# Patient Record
Sex: FEMALE | Race: WHITE | NOT HISPANIC OR LATINO | Employment: OTHER | URBAN - METROPOLITAN AREA
[De-identification: names, ages, dates, MRNs, and addresses within clinical notes are randomized per-mention and may not be internally consistent; named-entity substitution may affect disease eponyms.]

---

## 2019-01-01 ENCOUNTER — APPOINTMENT (OUTPATIENT)
Dept: RADIOLOGY | Facility: MEDICAL CENTER | Age: 84
DRG: 871 | End: 2019-01-01
Attending: HOSPITALIST
Payer: MEDICARE

## 2019-01-01 ENCOUNTER — HOSPITAL ENCOUNTER (OUTPATIENT)
Dept: RADIOLOGY | Facility: MEDICAL CENTER | Age: 84
End: 2019-10-10

## 2019-01-01 ENCOUNTER — TELEPHONE (OUTPATIENT)
Dept: VASCULAR LAB | Facility: MEDICAL CENTER | Age: 84
End: 2019-01-01

## 2019-01-01 ENCOUNTER — HOSPITAL ENCOUNTER (OUTPATIENT)
Facility: MEDICAL CENTER | Age: 84
End: 2019-01-01
Payer: MEDICARE

## 2019-01-01 ENCOUNTER — APPOINTMENT (OUTPATIENT)
Dept: RADIOLOGY | Facility: MEDICAL CENTER | Age: 84
DRG: 871 | End: 2019-01-01
Attending: INTERNAL MEDICINE
Payer: MEDICARE

## 2019-01-01 ENCOUNTER — HOSPITAL ENCOUNTER (INPATIENT)
Facility: MEDICAL CENTER | Age: 84
LOS: 5 days | DRG: 871 | End: 2019-10-13
Attending: EMERGENCY MEDICINE | Admitting: HOSPITALIST
Payer: MEDICARE

## 2019-01-01 ENCOUNTER — APPOINTMENT (OUTPATIENT)
Dept: CARDIOLOGY | Facility: MEDICAL CENTER | Age: 84
DRG: 871 | End: 2019-01-01
Attending: INTERNAL MEDICINE
Payer: MEDICARE

## 2019-01-01 VITALS
SYSTOLIC BLOOD PRESSURE: 91 MMHG | HEART RATE: 91 BPM | WEIGHT: 139.33 LBS | RESPIRATION RATE: 20 BRPM | DIASTOLIC BLOOD PRESSURE: 59 MMHG | OXYGEN SATURATION: 100 % | TEMPERATURE: 97.9 F | HEIGHT: 66 IN | BODY MASS INDEX: 22.39 KG/M2

## 2019-01-01 DIAGNOSIS — I95.89 HYPOTENSION DUE TO HYPOVOLEMIA: Primary | ICD-10-CM

## 2019-01-01 DIAGNOSIS — E86.0 DEHYDRATION: ICD-10-CM

## 2019-01-01 DIAGNOSIS — E86.1 HYPOTENSION DUE TO HYPOVOLEMIA: Primary | ICD-10-CM

## 2019-01-01 DIAGNOSIS — I48.91 ATRIAL FIBRILLATION WITH RAPID VENTRICULAR RESPONSE (HCC): ICD-10-CM

## 2019-01-01 DIAGNOSIS — N17.9 AKI (ACUTE KIDNEY INJURY) (HCC): ICD-10-CM

## 2019-01-01 DIAGNOSIS — R11.2 NAUSEA AND VOMITING, INTRACTABILITY OF VOMITING NOT SPECIFIED, UNSPECIFIED VOMITING TYPE: ICD-10-CM

## 2019-01-01 LAB
ALBUMIN SERPL BCP-MCNC: 2.9 G/DL (ref 3.2–4.9)
ALBUMIN SERPL BCP-MCNC: 3.3 G/DL (ref 3.2–4.9)
ALBUMIN SERPL BCP-MCNC: 3.4 G/DL (ref 3.2–4.9)
ALBUMIN/GLOB SERPL: 1.2 G/DL
ALBUMIN/GLOB SERPL: 1.5 G/DL
ALBUMIN/GLOB SERPL: 1.6 G/DL
ALP SERPL-CCNC: 88 U/L (ref 30–99)
ALP SERPL-CCNC: 95 U/L (ref 30–99)
ALP SERPL-CCNC: 98 U/L (ref 30–99)
ALT SERPL-CCNC: 12 U/L (ref 2–50)
ALT SERPL-CCNC: 15 U/L (ref 2–50)
ALT SERPL-CCNC: 15 U/L (ref 2–50)
ANION GAP SERPL CALC-SCNC: 12 MMOL/L (ref 0–11.9)
ANION GAP SERPL CALC-SCNC: 13 MMOL/L (ref 0–11.9)
ANION GAP SERPL CALC-SCNC: 13 MMOL/L (ref 0–11.9)
ANION GAP SERPL CALC-SCNC: 16 MMOL/L (ref 0–11.9)
ANION GAP SERPL CALC-SCNC: 16 MMOL/L (ref 0–11.9)
ANION GAP SERPL CALC-SCNC: 9 MMOL/L (ref 0–11.9)
ANISOCYTOSIS BLD QL SMEAR: ABNORMAL
APTT PPP: 31.5 SEC (ref 24.7–36)
AST SERPL-CCNC: 15 U/L (ref 12–45)
AST SERPL-CCNC: 21 U/L (ref 12–45)
AST SERPL-CCNC: 25 U/L (ref 12–45)
BACTERIA BLD CULT: NORMAL
BACTERIA BLD CULT: NORMAL
BASOPHILS # BLD AUTO: 0 % (ref 0–1.8)
BASOPHILS # BLD AUTO: 0 % (ref 0–1.8)
BASOPHILS # BLD: 0 K/UL (ref 0–0.12)
BASOPHILS # BLD: 0 K/UL (ref 0–0.12)
BILIRUB SERPL-MCNC: 1.1 MG/DL (ref 0.1–1.5)
BILIRUB SERPL-MCNC: 1.5 MG/DL (ref 0.1–1.5)
BILIRUB SERPL-MCNC: 1.6 MG/DL (ref 0.1–1.5)
BUN SERPL-MCNC: 114 MG/DL (ref 8–22)
BUN SERPL-MCNC: 121 MG/DL (ref 8–22)
BUN SERPL-MCNC: 28 MG/DL (ref 8–22)
BUN SERPL-MCNC: 47 MG/DL (ref 8–22)
BUN SERPL-MCNC: 70 MG/DL (ref 8–22)
BUN SERPL-MCNC: 8 MG/DL (ref 8–22)
BURR CELLS BLD QL SMEAR: NORMAL
BURR CELLS BLD QL SMEAR: NORMAL
CALCIUM SERPL-MCNC: 6.7 MG/DL (ref 8.5–10.5)
CALCIUM SERPL-MCNC: 7.9 MG/DL (ref 8.5–10.5)
CALCIUM SERPL-MCNC: 8.2 MG/DL (ref 8.5–10.5)
CALCIUM SERPL-MCNC: 8.4 MG/DL (ref 8.5–10.5)
CALCIUM SERPL-MCNC: 8.6 MG/DL (ref 8.5–10.5)
CALCIUM SERPL-MCNC: 8.8 MG/DL (ref 8.5–10.5)
CHLORIDE SERPL-SCNC: 104 MMOL/L (ref 96–112)
CHLORIDE SERPL-SCNC: 109 MMOL/L (ref 96–112)
CHLORIDE SERPL-SCNC: 110 MMOL/L (ref 96–112)
CHLORIDE SERPL-SCNC: 112 MMOL/L (ref 96–112)
CHLORIDE SERPL-SCNC: 96 MMOL/L (ref 96–112)
CHLORIDE SERPL-SCNC: 96 MMOL/L (ref 96–112)
CHLORIDE UR-SCNC: <15 MMOL/L
CO2 SERPL-SCNC: 19 MMOL/L (ref 20–33)
CO2 SERPL-SCNC: 21 MMOL/L (ref 20–33)
CO2 SERPL-SCNC: 22 MMOL/L (ref 20–33)
CO2 SERPL-SCNC: 23 MMOL/L (ref 20–33)
CORTIS SERPL-MCNC: 34.7 UG/DL (ref 0–23)
CREAT SERPL-MCNC: 0.62 MG/DL (ref 0.5–1.4)
CREAT SERPL-MCNC: 0.71 MG/DL (ref 0.5–1.4)
CREAT SERPL-MCNC: 0.83 MG/DL (ref 0.5–1.4)
CREAT SERPL-MCNC: 1.08 MG/DL (ref 0.5–1.4)
CREAT SERPL-MCNC: 2.44 MG/DL (ref 0.5–1.4)
CREAT SERPL-MCNC: 3.01 MG/DL (ref 0.5–1.4)
CREAT UR-MCNC: 103 MG/DL
D DIMER PPP IA.FEU-MCNC: 4.47 UG/ML (FEU) (ref 0–0.5)
EKG IMPRESSION: NORMAL
EOSINOPHIL # BLD AUTO: 0 K/UL (ref 0–0.51)
EOSINOPHIL # BLD AUTO: 0.18 K/UL (ref 0–0.51)
EOSINOPHIL NFR BLD: 0 % (ref 0–6.9)
EOSINOPHIL NFR BLD: 0.9 % (ref 0–6.9)
ERYTHROCYTE [DISTWIDTH] IN BLOOD BY AUTOMATED COUNT: 45.4 FL (ref 35.9–50)
ERYTHROCYTE [DISTWIDTH] IN BLOOD BY AUTOMATED COUNT: 46.3 FL (ref 35.9–50)
ERYTHROCYTE [DISTWIDTH] IN BLOOD BY AUTOMATED COUNT: 47.3 FL (ref 35.9–50)
ERYTHROCYTE [DISTWIDTH] IN BLOOD BY AUTOMATED COUNT: 49.9 FL (ref 35.9–50)
ERYTHROCYTE [DISTWIDTH] IN BLOOD BY AUTOMATED COUNT: 53.7 FL (ref 35.9–50)
ERYTHROCYTE [DISTWIDTH] IN BLOOD BY AUTOMATED COUNT: 56.1 FL (ref 35.9–50)
GLOBULIN SER CALC-MCNC: 2.1 G/DL (ref 1.9–3.5)
GLOBULIN SER CALC-MCNC: 2.2 G/DL (ref 1.9–3.5)
GLOBULIN SER CALC-MCNC: 2.4 G/DL (ref 1.9–3.5)
GLUCOSE SERPL-MCNC: 101 MG/DL (ref 65–99)
GLUCOSE SERPL-MCNC: 116 MG/DL (ref 65–99)
GLUCOSE SERPL-MCNC: 123 MG/DL (ref 65–99)
GLUCOSE SERPL-MCNC: 85 MG/DL (ref 65–99)
GLUCOSE SERPL-MCNC: 98 MG/DL (ref 65–99)
GLUCOSE SERPL-MCNC: 98 MG/DL (ref 65–99)
HCT VFR BLD AUTO: 36.5 % (ref 37–47)
HCT VFR BLD AUTO: 37.5 % (ref 37–47)
HCT VFR BLD AUTO: 38.2 % (ref 37–47)
HCT VFR BLD AUTO: 38.9 % (ref 37–47)
HCT VFR BLD AUTO: 39.6 % (ref 37–47)
HCT VFR BLD AUTO: 40.4 % (ref 37–47)
HGB BLD-MCNC: 11.8 G/DL (ref 12–16)
HGB BLD-MCNC: 11.9 G/DL (ref 12–16)
HGB BLD-MCNC: 12 G/DL (ref 12–16)
HGB BLD-MCNC: 12.5 G/DL (ref 12–16)
HGB BLD-MCNC: 12.6 G/DL (ref 12–16)
HGB BLD-MCNC: 13.2 G/DL (ref 12–16)
INR PPP: 1.18 (ref 0.87–1.13)
INR PPP: 1.33 (ref 0.87–1.13)
INR PPP: 2.61 (ref 0.87–1.13)
LACTATE BLD-SCNC: 1.4 MMOL/L (ref 0.5–2)
LACTATE BLD-SCNC: 1.6 MMOL/L (ref 0.5–2)
LACTATE BLD-SCNC: 1.7 MMOL/L (ref 0.5–2)
LACTATE BLD-SCNC: 1.7 MMOL/L (ref 0.5–2)
LV EJECT FRACT  99904: 65
LV EJECT FRACT MOD 2C 99903: 47.58
LV EJECT FRACT MOD 4C 99902: 52.47
LV EJECT FRACT MOD BP 99901: 50.1
LYMPHOCYTES # BLD AUTO: 1.17 K/UL (ref 1–4.8)
LYMPHOCYTES # BLD AUTO: 2.54 K/UL (ref 1–4.8)
LYMPHOCYTES NFR BLD: 12.4 % (ref 22–41)
LYMPHOCYTES NFR BLD: 6.1 % (ref 22–41)
MACROCYTES BLD QL SMEAR: ABNORMAL
MAGNESIUM SERPL-MCNC: 2 MG/DL (ref 1.5–2.5)
MAGNESIUM SERPL-MCNC: 2.4 MG/DL (ref 1.5–2.5)
MANUAL DIFF BLD: NORMAL
MANUAL DIFF BLD: NORMAL
MCH RBC QN AUTO: 25.3 PG (ref 27–33)
MCH RBC QN AUTO: 25.9 PG (ref 27–33)
MCH RBC QN AUTO: 26.1 PG (ref 27–33)
MCH RBC QN AUTO: 26.1 PG (ref 27–33)
MCH RBC QN AUTO: 26.2 PG (ref 27–33)
MCH RBC QN AUTO: 26.5 PG (ref 27–33)
MCHC RBC AUTO-ENTMCNC: 29.5 G/DL (ref 33.6–35)
MCHC RBC AUTO-ENTMCNC: 30.8 G/DL (ref 33.6–35)
MCHC RBC AUTO-ENTMCNC: 32.3 G/DL (ref 33.6–35)
MCHC RBC AUTO-ENTMCNC: 32.7 G/DL (ref 33.6–35)
MCHC RBC AUTO-ENTMCNC: 33.3 G/DL (ref 33.6–35)
MCHC RBC AUTO-ENTMCNC: 33.6 G/DL (ref 33.6–35)
MCV RBC AUTO: 77.8 FL (ref 81.4–97.8)
MCV RBC AUTO: 78.3 FL (ref 81.4–97.8)
MCV RBC AUTO: 79.1 FL (ref 81.4–97.8)
MCV RBC AUTO: 81.8 FL (ref 81.4–97.8)
MCV RBC AUTO: 84.9 FL (ref 81.4–97.8)
MCV RBC AUTO: 86 FL (ref 81.4–97.8)
MONOCYTES # BLD AUTO: 0.18 K/UL (ref 0–0.85)
MONOCYTES # BLD AUTO: 0.82 K/UL (ref 0–0.85)
MONOCYTES NFR BLD AUTO: 0.9 % (ref 0–13.4)
MONOCYTES NFR BLD AUTO: 4.3 % (ref 0–13.4)
MORPHOLOGY BLD-IMP: NORMAL
MORPHOLOGY BLD-IMP: NORMAL
NEUTROPHILS # BLD AUTO: 17.11 K/UL (ref 2–7.15)
NEUTROPHILS # BLD AUTO: 17.59 K/UL (ref 2–7.15)
NEUTROPHILS NFR BLD: 78.7 % (ref 44–72)
NEUTROPHILS NFR BLD: 80.9 % (ref 44–72)
NEUTS BAND NFR BLD MANUAL: 7.1 % (ref 0–10)
NEUTS BAND NFR BLD MANUAL: 8.7 % (ref 0–10)
NRBC # BLD AUTO: 0 K/UL
NRBC # BLD AUTO: 0 K/UL
NRBC BLD-RTO: 0 /100 WBC
NRBC BLD-RTO: 0 /100 WBC
PHOSPHATE SERPL-MCNC: 3.8 MG/DL (ref 2.5–4.5)
PLATELET # BLD AUTO: 160 K/UL (ref 164–446)
PLATELET # BLD AUTO: 164 K/UL (ref 164–446)
PLATELET # BLD AUTO: 186 K/UL (ref 164–446)
PLATELET # BLD AUTO: 211 K/UL (ref 164–446)
PLATELET # BLD AUTO: 227 K/UL (ref 164–446)
PLATELET # BLD AUTO: 234 K/UL (ref 164–446)
PLATELET BLD QL SMEAR: NORMAL
PLATELET BLD QL SMEAR: NORMAL
PMV BLD AUTO: 10.1 FL (ref 9–12.9)
PMV BLD AUTO: 10.4 FL (ref 9–12.9)
PMV BLD AUTO: 10.6 FL (ref 9–12.9)
PMV BLD AUTO: 10.7 FL (ref 9–12.9)
PMV BLD AUTO: 10.7 FL (ref 9–12.9)
PMV BLD AUTO: 10.8 FL (ref 9–12.9)
POIKILOCYTOSIS BLD QL SMEAR: NORMAL
POIKILOCYTOSIS BLD QL SMEAR: NORMAL
POTASSIUM SERPL-SCNC: 3.2 MMOL/L (ref 3.6–5.5)
POTASSIUM SERPL-SCNC: 3.3 MMOL/L (ref 3.6–5.5)
POTASSIUM SERPL-SCNC: 3.3 MMOL/L (ref 3.6–5.5)
POTASSIUM SERPL-SCNC: 3.4 MMOL/L (ref 3.6–5.5)
POTASSIUM SERPL-SCNC: 3.5 MMOL/L (ref 3.6–5.5)
POTASSIUM SERPL-SCNC: 3.7 MMOL/L (ref 3.6–5.5)
POTASSIUM UR-SCNC: 54.9 MMOL/L
PROCALCITONIN SERPL-MCNC: 3.77 NG/ML
PROT SERPL-MCNC: 5.3 G/DL (ref 6–8.2)
PROT SERPL-MCNC: 5.4 G/DL (ref 6–8.2)
PROT SERPL-MCNC: 5.6 G/DL (ref 6–8.2)
PROT UR-MCNC: 61.4 MG/DL (ref 0–15)
PROTHROMBIN TIME: 15.3 SEC (ref 12–14.6)
PROTHROMBIN TIME: 16.9 SEC (ref 12–14.6)
PROTHROMBIN TIME: 28.9 SEC (ref 12–14.6)
RBC # BLD AUTO: 4.46 M/UL (ref 4.2–5.4)
RBC # BLD AUTO: 4.58 M/UL (ref 4.2–5.4)
RBC # BLD AUTO: 4.7 M/UL (ref 4.2–5.4)
RBC # BLD AUTO: 4.82 M/UL (ref 4.2–5.4)
RBC # BLD AUTO: 4.83 M/UL (ref 4.2–5.4)
RBC # BLD AUTO: 5.06 M/UL (ref 4.2–5.4)
RBC BLD AUTO: PRESENT
RBC BLD AUTO: PRESENT
SIGNIFICANT IND 70042: NORMAL
SIGNIFICANT IND 70042: NORMAL
SITE SITE: NORMAL
SITE SITE: NORMAL
SODIUM SERPL-SCNC: 133 MMOL/L (ref 135–145)
SODIUM SERPL-SCNC: 133 MMOL/L (ref 135–145)
SODIUM SERPL-SCNC: 140 MMOL/L (ref 135–145)
SODIUM SERPL-SCNC: 140 MMOL/L (ref 135–145)
SODIUM SERPL-SCNC: 143 MMOL/L (ref 135–145)
SODIUM SERPL-SCNC: 144 MMOL/L (ref 135–145)
SODIUM UR-SCNC: 19 MMOL/L
SOURCE SOURCE: NORMAL
SOURCE SOURCE: NORMAL
TOXIC GRANULES BLD QL SMEAR: SLIGHT
TROPONIN T SERPL-MCNC: 17 NG/L (ref 6–19)
TROPONIN T SERPL-MCNC: 21 NG/L (ref 6–19)
TROPONIN T SERPL-MCNC: 23 NG/L (ref 6–19)
TROPONIN T SERPL-MCNC: 24 NG/L (ref 6–19)
TSH SERPL DL<=0.005 MIU/L-ACNC: 10.92 UIU/ML (ref 0.38–5.33)
WBC # BLD AUTO: 10.9 K/UL (ref 4.8–10.8)
WBC # BLD AUTO: 15.3 K/UL (ref 4.8–10.8)
WBC # BLD AUTO: 17.8 K/UL (ref 4.8–10.8)
WBC # BLD AUTO: 18 K/UL (ref 4.8–10.8)
WBC # BLD AUTO: 19.1 K/UL (ref 4.8–10.8)
WBC # BLD AUTO: 20.5 K/UL (ref 4.8–10.8)

## 2019-01-01 PROCEDURE — 99223 1ST HOSP IP/OBS HIGH 75: CPT | Performed by: INTERNAL MEDICINE

## 2019-01-01 PROCEDURE — 700102 HCHG RX REV CODE 250 W/ 637 OVERRIDE(OP): Performed by: HOSPITALIST

## 2019-01-01 PROCEDURE — 83605 ASSAY OF LACTIC ACID: CPT | Mod: 91

## 2019-01-01 PROCEDURE — 700105 HCHG RX REV CODE 258: Performed by: HOSPITALIST

## 2019-01-01 PROCEDURE — 700105 HCHG RX REV CODE 258: Performed by: EMERGENCY MEDICINE

## 2019-01-01 PROCEDURE — 87040 BLOOD CULTURE FOR BACTERIA: CPT

## 2019-01-01 PROCEDURE — 85027 COMPLETE CBC AUTOMATED: CPT | Mod: 91

## 2019-01-01 PROCEDURE — 99233 SBSQ HOSP IP/OBS HIGH 50: CPT | Performed by: HOSPITALIST

## 2019-01-01 PROCEDURE — 36415 COLL VENOUS BLD VENIPUNCTURE: CPT

## 2019-01-01 PROCEDURE — 84156 ASSAY OF PROTEIN URINE: CPT

## 2019-01-01 PROCEDURE — 99232 SBSQ HOSP IP/OBS MODERATE 35: CPT | Performed by: HOSPITALIST

## 2019-01-01 PROCEDURE — 84145 PROCALCITONIN (PCT): CPT

## 2019-01-01 PROCEDURE — 84133 ASSAY OF URINE POTASSIUM: CPT

## 2019-01-01 PROCEDURE — A9270 NON-COVERED ITEM OR SERVICE: HCPCS | Performed by: HOSPITALIST

## 2019-01-01 PROCEDURE — 97162 PT EVAL MOD COMPLEX 30 MIN: CPT

## 2019-01-01 PROCEDURE — 700105 HCHG RX REV CODE 258: Performed by: INTERNAL MEDICINE

## 2019-01-01 PROCEDURE — 770020 HCHG ROOM/CARE - TELE (206)

## 2019-01-01 PROCEDURE — 700101 HCHG RX REV CODE 250: Performed by: HOSPITALIST

## 2019-01-01 PROCEDURE — 80048 BASIC METABOLIC PNL TOTAL CA: CPT

## 2019-01-01 PROCEDURE — 700102 HCHG RX REV CODE 250 W/ 637 OVERRIDE(OP): Performed by: INTERNAL MEDICINE

## 2019-01-01 PROCEDURE — 74220 X-RAY XM ESOPHAGUS 1CNTRST: CPT

## 2019-01-01 PROCEDURE — 71046 X-RAY EXAM CHEST 2 VIEWS: CPT

## 2019-01-01 PROCEDURE — 93306 TTE W/DOPPLER COMPLETE: CPT

## 2019-01-01 PROCEDURE — 93005 ELECTROCARDIOGRAM TRACING: CPT | Performed by: HOSPITALIST

## 2019-01-01 PROCEDURE — 85379 FIBRIN DEGRADATION QUANT: CPT

## 2019-01-01 PROCEDURE — 85027 COMPLETE CBC AUTOMATED: CPT

## 2019-01-01 PROCEDURE — 93005 ELECTROCARDIOGRAM TRACING: CPT | Performed by: EMERGENCY MEDICINE

## 2019-01-01 PROCEDURE — 93005 ELECTROCARDIOGRAM TRACING: CPT

## 2019-01-01 PROCEDURE — 99223 1ST HOSP IP/OBS HIGH 75: CPT | Mod: AI | Performed by: HOSPITALIST

## 2019-01-01 PROCEDURE — 84100 ASSAY OF PHOSPHORUS: CPT

## 2019-01-01 PROCEDURE — 99285 EMERGENCY DEPT VISIT HI MDM: CPT

## 2019-01-01 PROCEDURE — 83605 ASSAY OF LACTIC ACID: CPT

## 2019-01-01 PROCEDURE — 700111 HCHG RX REV CODE 636 W/ 250 OVERRIDE (IP): Performed by: EMERGENCY MEDICINE

## 2019-01-01 PROCEDURE — 93010 ELECTROCARDIOGRAM REPORT: CPT | Mod: 76 | Performed by: INTERNAL MEDICINE

## 2019-01-01 PROCEDURE — 99358 PROLONG SERVICE W/O CONTACT: CPT | Performed by: HOSPITALIST

## 2019-01-01 PROCEDURE — 85610 PROTHROMBIN TIME: CPT

## 2019-01-01 PROCEDURE — 84443 ASSAY THYROID STIM HORMONE: CPT

## 2019-01-01 PROCEDURE — 700111 HCHG RX REV CODE 636 W/ 250 OVERRIDE (IP): Performed by: INTERNAL MEDICINE

## 2019-01-01 PROCEDURE — 85007 BL SMEAR W/DIFF WBC COUNT: CPT

## 2019-01-01 PROCEDURE — 700111 HCHG RX REV CODE 636 W/ 250 OVERRIDE (IP): Performed by: HOSPITALIST

## 2019-01-01 PROCEDURE — 97165 OT EVAL LOW COMPLEX 30 MIN: CPT

## 2019-01-01 PROCEDURE — 51798 US URINE CAPACITY MEASURE: CPT

## 2019-01-01 PROCEDURE — 82533 TOTAL CORTISOL: CPT

## 2019-01-01 PROCEDURE — 85730 THROMBOPLASTIN TIME PARTIAL: CPT

## 2019-01-01 PROCEDURE — 700117 HCHG RX CONTRAST REV CODE 255: Performed by: INTERNAL MEDICINE

## 2019-01-01 PROCEDURE — 83735 ASSAY OF MAGNESIUM: CPT

## 2019-01-01 PROCEDURE — 93010 ELECTROCARDIOGRAM REPORT: CPT | Performed by: INTERNAL MEDICINE

## 2019-01-01 PROCEDURE — 82436 ASSAY OF URINE CHLORIDE: CPT

## 2019-01-01 PROCEDURE — A9270 NON-COVERED ITEM OR SERVICE: HCPCS | Performed by: INTERNAL MEDICINE

## 2019-01-01 PROCEDURE — 93306 TTE W/DOPPLER COMPLETE: CPT | Mod: 26 | Performed by: INTERNAL MEDICINE

## 2019-01-01 PROCEDURE — 97535 SELF CARE MNGMENT TRAINING: CPT

## 2019-01-01 PROCEDURE — 99239 HOSP IP/OBS DSCHRG MGMT >30: CPT | Performed by: HOSPITALIST

## 2019-01-01 PROCEDURE — 80053 COMPREHEN METABOLIC PANEL: CPT

## 2019-01-01 PROCEDURE — 82570 ASSAY OF URINE CREATININE: CPT

## 2019-01-01 PROCEDURE — 84484 ASSAY OF TROPONIN QUANT: CPT

## 2019-01-01 PROCEDURE — 71045 X-RAY EXAM CHEST 1 VIEW: CPT

## 2019-01-01 PROCEDURE — 71275 CT ANGIOGRAPHY CHEST: CPT

## 2019-01-01 PROCEDURE — 96365 THER/PROPH/DIAG IV INF INIT: CPT

## 2019-01-01 PROCEDURE — 76775 US EXAM ABDO BACK WALL LIM: CPT

## 2019-01-01 PROCEDURE — 92610 EVALUATE SWALLOWING FUNCTION: CPT

## 2019-01-01 PROCEDURE — 84484 ASSAY OF TROPONIN QUANT: CPT | Mod: 91

## 2019-01-01 PROCEDURE — 84300 ASSAY OF URINE SODIUM: CPT

## 2019-01-01 RX ORDER — LOVASTATIN 20 MG/1
20 TABLET ORAL NIGHTLY
Status: DISCONTINUED | OUTPATIENT
Start: 2019-01-01 | End: 2019-01-01 | Stop reason: HOSPADM

## 2019-01-01 RX ORDER — METRONIDAZOLE 500 MG/1
500 TABLET ORAL EVERY 8 HOURS
Status: DISCONTINUED | OUTPATIENT
Start: 2019-01-01 | End: 2019-01-01 | Stop reason: HOSPADM

## 2019-01-01 RX ORDER — METRONIDAZOLE 500 MG/1
500 TABLET ORAL EVERY 8 HOURS
Status: DISCONTINUED | OUTPATIENT
Start: 2019-01-01 | End: 2019-01-01

## 2019-01-01 RX ORDER — HEPARIN SODIUM 5000 [USP'U]/100ML
INJECTION, SOLUTION INTRAVENOUS CONTINUOUS
Status: DISCONTINUED | OUTPATIENT
Start: 2019-01-01 | End: 2019-01-01

## 2019-01-01 RX ORDER — POTASSIUM CHLORIDE 20 MEQ/1
40 TABLET, EXTENDED RELEASE ORAL ONCE
Status: DISPENSED | OUTPATIENT
Start: 2019-01-01 | End: 2019-01-01

## 2019-01-01 RX ORDER — HEPARIN SODIUM 1000 [USP'U]/ML
4000 INJECTION, SOLUTION INTRAVENOUS; SUBCUTANEOUS ONCE
Status: COMPLETED | OUTPATIENT
Start: 2019-01-01 | End: 2019-01-01

## 2019-01-01 RX ORDER — CLINDAMYCIN PHOSPHATE 600 MG/50ML
600 INJECTION, SOLUTION INTRAVENOUS EVERY 8 HOURS
Status: DISCONTINUED | OUTPATIENT
Start: 2019-01-01 | End: 2019-01-01

## 2019-01-01 RX ORDER — WARFARIN SODIUM 2.5 MG/1
2.5 TABLET ORAL DAILY
Qty: 30 TAB | Refills: 3 | Status: SHIPPED | OUTPATIENT
Start: 2019-01-01

## 2019-01-01 RX ORDER — SODIUM CHLORIDE 9 MG/ML
250 INJECTION, SOLUTION INTRAVENOUS ONCE
Status: COMPLETED | OUTPATIENT
Start: 2019-01-01 | End: 2019-01-01

## 2019-01-01 RX ORDER — CEFDINIR 250 MG/5ML
300 POWDER, FOR SUSPENSION ORAL EVERY 12 HOURS
Status: DISCONTINUED | OUTPATIENT
Start: 2019-01-01 | End: 2019-01-01 | Stop reason: HOSPADM

## 2019-01-01 RX ORDER — POLYETHYLENE GLYCOL 3350 17 G/17G
1 POWDER, FOR SOLUTION ORAL
Status: DISCONTINUED | OUTPATIENT
Start: 2019-01-01 | End: 2019-01-01 | Stop reason: HOSPADM

## 2019-01-01 RX ORDER — SODIUM CHLORIDE, SODIUM LACTATE, POTASSIUM CHLORIDE, AND CALCIUM CHLORIDE .6; .31; .03; .02 G/100ML; G/100ML; G/100ML; G/100ML
1000 INJECTION, SOLUTION INTRAVENOUS ONCE
Status: COMPLETED | OUTPATIENT
Start: 2019-01-01 | End: 2019-01-01

## 2019-01-01 RX ORDER — ONDANSETRON 4 MG/1
4 TABLET, FILM COATED ORAL EVERY 4 HOURS PRN
Qty: 20 TAB | Refills: 0 | Status: SHIPPED | OUTPATIENT
Start: 2019-01-01 | End: 2019-10-27

## 2019-01-01 RX ORDER — SODIUM CHLORIDE, SODIUM LACTATE, POTASSIUM CHLORIDE, AND CALCIUM CHLORIDE .6; .31; .03; .02 G/100ML; G/100ML; G/100ML; G/100ML
1000 INJECTION, SOLUTION INTRAVENOUS
Status: DISCONTINUED | OUTPATIENT
Start: 2019-01-01 | End: 2019-01-01 | Stop reason: HOSPADM

## 2019-01-01 RX ORDER — POTASSIUM CHLORIDE 20 MEQ/1
20 TABLET, EXTENDED RELEASE ORAL DAILY
Status: DISCONTINUED | OUTPATIENT
Start: 2019-01-01 | End: 2019-01-01 | Stop reason: HOSPADM

## 2019-01-01 RX ORDER — DILTIAZEM HYDROCHLORIDE 5 MG/ML
10 INJECTION INTRAVENOUS ONCE
Status: DISCONTINUED | OUTPATIENT
Start: 2019-01-01 | End: 2019-01-01

## 2019-01-01 RX ORDER — FAMOTIDINE 20 MG/1
20 TABLET, FILM COATED ORAL EVERY 24 HOURS
Status: DISCONTINUED | OUTPATIENT
Start: 2019-01-01 | End: 2019-01-01 | Stop reason: HOSPADM

## 2019-01-01 RX ORDER — SUCRALFATE ORAL 1 G/10ML
1 SUSPENSION ORAL EVERY 6 HOURS
Qty: 560 ML | Refills: 0 | Status: SHIPPED | OUTPATIENT
Start: 2019-01-01 | End: 2019-10-27

## 2019-01-01 RX ORDER — ONDANSETRON 4 MG/1
4 TABLET, ORALLY DISINTEGRATING ORAL EVERY 4 HOURS PRN
Status: DISCONTINUED | OUTPATIENT
Start: 2019-01-01 | End: 2019-01-01 | Stop reason: HOSPADM

## 2019-01-01 RX ORDER — FAMOTIDINE 20 MG/1
20 TABLET, FILM COATED ORAL EVERY 24 HOURS
Qty: 30 TAB | Refills: 3 | Status: SHIPPED | OUTPATIENT
Start: 2019-01-01

## 2019-01-01 RX ORDER — AMOXICILLIN 250 MG
2 CAPSULE ORAL 2 TIMES DAILY
Status: DISCONTINUED | OUTPATIENT
Start: 2019-01-01 | End: 2019-01-01 | Stop reason: HOSPADM

## 2019-01-01 RX ORDER — CEFDINIR 250 MG/5ML
300 POWDER, FOR SUSPENSION ORAL EVERY 12 HOURS
Qty: 20 ML | Refills: 0 | Status: SHIPPED | OUTPATIENT
Start: 2019-01-01 | End: 2019-10-15

## 2019-01-01 RX ORDER — ACETAMINOPHEN 325 MG/1
650 TABLET ORAL EVERY 6 HOURS PRN
Status: DISCONTINUED | OUTPATIENT
Start: 2019-01-01 | End: 2019-01-01 | Stop reason: HOSPADM

## 2019-01-01 RX ORDER — CEFDINIR 250 MG/5ML
300 POWDER, FOR SUSPENSION ORAL EVERY 12 HOURS
Qty: 20 ML | Refills: 0 | Status: SHIPPED | OUTPATIENT
Start: 2019-01-01 | End: 2019-01-01

## 2019-01-01 RX ORDER — AMIODARONE HYDROCHLORIDE 200 MG/1
200 TABLET ORAL
Status: DISCONTINUED | OUTPATIENT
Start: 2019-01-01 | End: 2019-01-01 | Stop reason: HOSPADM

## 2019-01-01 RX ORDER — ONDANSETRON 2 MG/ML
4 INJECTION INTRAMUSCULAR; INTRAVENOUS EVERY 4 HOURS PRN
Status: DISCONTINUED | OUTPATIENT
Start: 2019-01-01 | End: 2019-01-01 | Stop reason: HOSPADM

## 2019-01-01 RX ORDER — WARFARIN SODIUM 2.5 MG/1
2.5 TABLET ORAL DAILY
Qty: 30 TAB | Refills: 3 | Status: SHIPPED | OUTPATIENT
Start: 2019-01-01 | End: 2019-01-01 | Stop reason: SDUPTHER

## 2019-01-01 RX ORDER — METRONIDAZOLE 500 MG/1
500 TABLET ORAL EVERY 8 HOURS
Qty: 6 TAB | Refills: 0 | Status: SHIPPED | OUTPATIENT
Start: 2019-01-01 | End: 2019-01-01

## 2019-01-01 RX ORDER — AMIODARONE HYDROCHLORIDE 200 MG/1
200 TABLET ORAL DAILY
Qty: 30 TAB | Refills: 3 | Status: SHIPPED | OUTPATIENT
Start: 2019-01-01 | End: 2019-01-01

## 2019-01-01 RX ORDER — WARFARIN SODIUM 4 MG/1
4 TABLET ORAL DAILY
Status: DISCONTINUED | OUTPATIENT
Start: 2019-01-01 | End: 2019-01-01

## 2019-01-01 RX ORDER — PROCHLORPERAZINE EDISYLATE 5 MG/ML
10 INJECTION INTRAMUSCULAR; INTRAVENOUS EVERY 6 HOURS PRN
Status: DISCONTINUED | OUTPATIENT
Start: 2019-01-01 | End: 2019-01-01 | Stop reason: HOSPADM

## 2019-01-01 RX ORDER — POTASSIUM CHLORIDE 7.45 MG/ML
10 INJECTION INTRAVENOUS
Status: COMPLETED | OUTPATIENT
Start: 2019-01-01 | End: 2019-01-01

## 2019-01-01 RX ORDER — SUCRALFATE ORAL 1 G/10ML
1 SUSPENSION ORAL EVERY 6 HOURS
Status: DISCONTINUED | OUTPATIENT
Start: 2019-01-01 | End: 2019-01-01 | Stop reason: HOSPADM

## 2019-01-01 RX ORDER — SUCRALFATE ORAL 1 G/10ML
1 SUSPENSION ORAL EVERY 6 HOURS
Qty: 560 ML | Refills: 0 | Status: SHIPPED | OUTPATIENT
Start: 2019-01-01 | End: 2019-01-01

## 2019-01-01 RX ORDER — CALCIUM CHLORIDE 100 MG/ML
1 INJECTION INTRAVENOUS; INTRAVENTRICULAR ONCE
Status: DISCONTINUED | OUTPATIENT
Start: 2019-01-01 | End: 2019-01-01

## 2019-01-01 RX ORDER — SODIUM CHLORIDE 9 MG/ML
INJECTION, SOLUTION INTRAVENOUS CONTINUOUS
Status: DISCONTINUED | OUTPATIENT
Start: 2019-01-01 | End: 2019-01-01

## 2019-01-01 RX ORDER — CEFDINIR 300 MG/1
300 CAPSULE ORAL EVERY 12 HOURS
Status: DISCONTINUED | OUTPATIENT
Start: 2019-01-01 | End: 2019-01-01

## 2019-01-01 RX ORDER — POTASSIUM CHLORIDE 20 MEQ/1
40 TABLET, EXTENDED RELEASE ORAL ONCE
Status: COMPLETED | OUTPATIENT
Start: 2019-01-01 | End: 2019-01-01

## 2019-01-01 RX ORDER — DILTIAZEM HYDROCHLORIDE 5 MG/ML
10 INJECTION INTRAVENOUS EVERY 6 HOURS PRN
Status: DISCONTINUED | OUTPATIENT
Start: 2019-01-01 | End: 2019-01-01 | Stop reason: HOSPADM

## 2019-01-01 RX ORDER — ONDANSETRON 4 MG/1
4 TABLET, FILM COATED ORAL EVERY 4 HOURS PRN
Qty: 20 TAB | Refills: 0 | Status: SHIPPED | OUTPATIENT
Start: 2019-01-01 | End: 2019-01-01 | Stop reason: SDUPTHER

## 2019-01-01 RX ORDER — AMIODARONE HYDROCHLORIDE 200 MG/1
200 TABLET ORAL DAILY
Qty: 30 TAB | Refills: 3 | Status: SHIPPED | OUTPATIENT
Start: 2019-01-01

## 2019-01-01 RX ORDER — BISACODYL 10 MG
10 SUPPOSITORY, RECTAL RECTAL
Status: DISCONTINUED | OUTPATIENT
Start: 2019-01-01 | End: 2019-01-01 | Stop reason: HOSPADM

## 2019-01-01 RX ORDER — FAMOTIDINE 20 MG/1
20 TABLET, FILM COATED ORAL EVERY 24 HOURS
Qty: 30 TAB | Refills: 3 | Status: SHIPPED | OUTPATIENT
Start: 2019-01-01 | End: 2019-01-01

## 2019-01-01 RX ORDER — SODIUM CHLORIDE 9 MG/ML
30 INJECTION, SOLUTION INTRAVENOUS ONCE
Status: COMPLETED | OUTPATIENT
Start: 2019-01-01 | End: 2019-01-01

## 2019-01-01 RX ORDER — METRONIDAZOLE 500 MG/1
500 TABLET ORAL EVERY 8 HOURS
Qty: 6 TAB | Refills: 0 | Status: SHIPPED | OUTPATIENT
Start: 2019-01-01 | End: 2019-10-15

## 2019-01-01 RX ORDER — HEPARIN SODIUM 5000 [USP'U]/ML
5000 INJECTION, SOLUTION INTRAVENOUS; SUBCUTANEOUS EVERY 8 HOURS
Status: DISCONTINUED | OUTPATIENT
Start: 2019-01-01 | End: 2019-01-01

## 2019-01-01 RX ORDER — SODIUM CHLORIDE 9 MG/ML
500 INJECTION, SOLUTION INTRAVENOUS ONCE
Status: COMPLETED | OUTPATIENT
Start: 2019-01-01 | End: 2019-01-01

## 2019-01-01 RX ORDER — LEVOTHYROXINE SODIUM 0.1 MG/1
100 TABLET ORAL DAILY
Status: DISCONTINUED | OUTPATIENT
Start: 2019-01-01 | End: 2019-01-01 | Stop reason: HOSPADM

## 2019-01-01 RX ORDER — DEXTROSE MONOHYDRATE 50 MG/ML
INJECTION, SOLUTION INTRAVENOUS CONTINUOUS
Status: DISCONTINUED | OUTPATIENT
Start: 2019-01-01 | End: 2019-01-01

## 2019-01-01 RX ORDER — HEPARIN SODIUM 1000 [USP'U]/ML
2200 INJECTION, SOLUTION INTRAVENOUS; SUBCUTANEOUS PRN
Status: DISCONTINUED | OUTPATIENT
Start: 2019-01-01 | End: 2019-01-01

## 2019-01-01 RX ADMIN — SUCRALFATE 1 G: 1 SUSPENSION ORAL at 16:42

## 2019-01-01 RX ADMIN — FAMOTIDINE 20 MG: 20 TABLET ORAL at 16:42

## 2019-01-01 RX ADMIN — AMIODARONE HYDROCHLORIDE 150 MG: 1.5 INJECTION, SOLUTION INTRAVENOUS at 00:31

## 2019-01-01 RX ADMIN — ACETAMINOPHEN 650 MG: 325 TABLET, FILM COATED ORAL at 16:13

## 2019-01-01 RX ADMIN — HEPARIN SODIUM 4000 UNITS: 1000 INJECTION, SOLUTION INTRAVENOUS; SUBCUTANEOUS at 01:22

## 2019-01-01 RX ADMIN — POTASSIUM CHLORIDE 40 MEQ: 1500 TABLET, EXTENDED RELEASE ORAL at 09:16

## 2019-01-01 RX ADMIN — HEPARIN SODIUM 900 UNITS/HR: 5000 INJECTION, SOLUTION INTRAVENOUS at 01:30

## 2019-01-01 RX ADMIN — CEFDINIR 300 MG: 250 POWDER, FOR SUSPENSION ORAL at 21:18

## 2019-01-01 RX ADMIN — IOHEXOL 25 ML: 350 INJECTION, SOLUTION INTRAVENOUS at 04:10

## 2019-01-01 RX ADMIN — HEPARIN SODIUM 5000 UNITS: 5000 INJECTION, SOLUTION INTRAVENOUS; SUBCUTANEOUS at 00:34

## 2019-01-01 RX ADMIN — SUCRALFATE 1 G: 1 SUSPENSION ORAL at 11:43

## 2019-01-01 RX ADMIN — SODIUM CHLORIDE 250 ML: 9 INJECTION, SOLUTION INTRAVENOUS at 06:13

## 2019-01-01 RX ADMIN — SODIUM CHLORIDE 1707 ML: 9 INJECTION, SOLUTION INTRAVENOUS at 00:00

## 2019-01-01 RX ADMIN — AMIODARONE HYDROCHLORIDE 150 MG: 1.5 INJECTION, SOLUTION INTRAVENOUS at 23:49

## 2019-01-01 RX ADMIN — SODIUM CHLORIDE, POTASSIUM CHLORIDE, SODIUM LACTATE AND CALCIUM CHLORIDE 1000 ML: 600; 310; 30; 20 INJECTION, SOLUTION INTRAVENOUS at 22:15

## 2019-01-01 RX ADMIN — POTASSIUM CHLORIDE 10 MEQ: 7.46 INJECTION, SOLUTION INTRAVENOUS at 01:21

## 2019-01-01 RX ADMIN — AMIODARONE HYDROCHLORIDE 200 MG: 200 TABLET ORAL at 10:54

## 2019-01-01 RX ADMIN — SODIUM CHLORIDE 500 ML: 9 INJECTION, SOLUTION INTRAVENOUS at 00:29

## 2019-01-01 RX ADMIN — CLINDAMYCIN IN 5 PERCENT DEXTROSE 600 MG: 12 INJECTION, SOLUTION INTRAVENOUS at 04:54

## 2019-01-01 RX ADMIN — ONDANSETRON 4 MG: 2 INJECTION INTRAMUSCULAR; INTRAVENOUS at 05:24

## 2019-01-01 RX ADMIN — AMIODARONE HYDROCHLORIDE 1 MG/MIN: 50 INJECTION, SOLUTION INTRAVENOUS at 00:10

## 2019-01-01 RX ADMIN — CEFTRIAXONE SODIUM 1 G: 1 INJECTION, POWDER, FOR SOLUTION INTRAMUSCULAR; INTRAVENOUS at 22:35

## 2019-01-01 RX ADMIN — METRONIDAZOLE 500 MG: 500 TABLET ORAL at 05:34

## 2019-01-01 RX ADMIN — SODIUM CHLORIDE: 9 INJECTION, SOLUTION INTRAVENOUS at 02:54

## 2019-01-01 RX ADMIN — METRONIDAZOLE 500 MG: 500 INJECTION, SOLUTION INTRAVENOUS at 21:43

## 2019-01-01 RX ADMIN — POTASSIUM CHLORIDE 10 MEQ: 7.46 INJECTION, SOLUTION INTRAVENOUS at 02:12

## 2019-01-01 RX ADMIN — CEFTRIAXONE SODIUM 1 G: 1 INJECTION, POWDER, FOR SOLUTION INTRAMUSCULAR; INTRAVENOUS at 06:11

## 2019-01-01 RX ADMIN — CLINDAMYCIN IN 5 PERCENT DEXTROSE 600 MG: 12 INJECTION, SOLUTION INTRAVENOUS at 18:50

## 2019-01-01 RX ADMIN — LOVASTATIN 20 MG: 20 TABLET ORAL at 21:54

## 2019-01-01 RX ADMIN — LEVOTHYROXINE SODIUM 100 MCG: 100 TABLET ORAL at 05:21

## 2019-01-01 RX ADMIN — ONDANSETRON 4 MG: 2 INJECTION INTRAMUSCULAR; INTRAVENOUS at 21:24

## 2019-01-01 RX ADMIN — WARFARIN SODIUM 4 MG: 4 TABLET ORAL at 16:42

## 2019-01-01 RX ADMIN — HEPARIN SODIUM 5000 UNITS: 5000 INJECTION, SOLUTION INTRAVENOUS; SUBCUTANEOUS at 05:21

## 2019-01-01 RX ADMIN — CALCIUM CHLORIDE 1 G: 100 INJECTION, SOLUTION INTRAVENOUS at 00:21

## 2019-01-01 RX ADMIN — METRONIDAZOLE 500 MG: 500 TABLET ORAL at 21:18

## 2019-01-01 RX ADMIN — FAMOTIDINE 20 MG: 20 TABLET ORAL at 21:41

## 2019-01-01 RX ADMIN — PROCHLORPERAZINE EDISYLATE 10 MG: 5 INJECTION INTRAMUSCULAR; INTRAVENOUS at 21:21

## 2019-01-01 RX ADMIN — METRONIDAZOLE 500 MG: 500 TABLET ORAL at 13:18

## 2019-01-01 RX ADMIN — LOVASTATIN 20 MG: 20 TABLET ORAL at 20:20

## 2019-01-01 RX ADMIN — METRONIDAZOLE 500 MG: 500 INJECTION, SOLUTION INTRAVENOUS at 18:51

## 2019-01-01 RX ADMIN — ONDANSETRON 4 MG: 2 INJECTION INTRAMUSCULAR; INTRAVENOUS at 21:52

## 2019-01-01 RX ADMIN — METRONIDAZOLE 500 MG: 500 TABLET ORAL at 21:54

## 2019-01-01 RX ADMIN — SODIUM CHLORIDE: 9 INJECTION, SOLUTION INTRAVENOUS at 01:00

## 2019-01-01 RX ADMIN — SUCRALFATE 1 G: 1 SUSPENSION ORAL at 00:00

## 2019-01-01 RX ADMIN — LEVOTHYROXINE SODIUM 100 MCG: 100 TABLET ORAL at 06:02

## 2019-01-01 RX ADMIN — SODIUM CHLORIDE: 9 INJECTION, SOLUTION INTRAVENOUS at 22:06

## 2019-01-01 RX ADMIN — POTASSIUM CHLORIDE 20 MEQ: 1500 TABLET, EXTENDED RELEASE ORAL at 21:18

## 2019-01-01 RX ADMIN — LOVASTATIN 20 MG: 20 TABLET ORAL at 21:41

## 2019-01-01 RX ADMIN — CEFTRIAXONE SODIUM 1 G: 1 INJECTION, POWDER, FOR SOLUTION INTRAMUSCULAR; INTRAVENOUS at 12:49

## 2019-01-01 RX ADMIN — SODIUM CHLORIDE: 9 INJECTION, SOLUTION INTRAVENOUS at 00:21

## 2019-01-01 RX ADMIN — ONDANSETRON 4 MG: 2 INJECTION INTRAMUSCULAR; INTRAVENOUS at 12:54

## 2019-01-01 RX ADMIN — ACETAMINOPHEN 650 MG: 325 TABLET, FILM COATED ORAL at 02:40

## 2019-01-01 RX ADMIN — SODIUM CHLORIDE: 9 INJECTION, SOLUTION INTRAVENOUS at 16:29

## 2019-01-01 RX ADMIN — METRONIDAZOLE 500 MG: 500 TABLET ORAL at 06:02

## 2019-01-01 RX ADMIN — SUCRALFATE 1 G: 1 SUSPENSION ORAL at 06:10

## 2019-01-01 RX ADMIN — SENNOSIDES, DOCUSATE SODIUM 2 TABLET: 50; 8.6 TABLET, FILM COATED ORAL at 16:42

## 2019-01-01 RX ADMIN — LOVASTATIN 20 MG: 20 TABLET ORAL at 00:37

## 2019-01-01 RX ADMIN — PROCHLORPERAZINE EDISYLATE 10 MG: 5 INJECTION INTRAMUSCULAR; INTRAVENOUS at 14:52

## 2019-01-01 RX ADMIN — ONDANSETRON 4 MG: 2 INJECTION INTRAMUSCULAR; INTRAVENOUS at 08:30

## 2019-01-01 RX ADMIN — SENNOSIDES, DOCUSATE SODIUM 2 TABLET: 50; 8.6 TABLET, FILM COATED ORAL at 05:29

## 2019-01-01 RX ADMIN — LEVOTHYROXINE SODIUM 100 MCG: 100 TABLET ORAL at 05:31

## 2019-01-01 RX ADMIN — AMIODARONE HYDROCHLORIDE 200 MG: 200 TABLET ORAL at 06:09

## 2019-01-01 RX ADMIN — SUCRALFATE 1 G: 1 SUSPENSION ORAL at 17:07

## 2019-01-01 RX ADMIN — ONDANSETRON 4 MG: 2 INJECTION INTRAMUSCULAR; INTRAVENOUS at 02:48

## 2019-01-01 RX ADMIN — METRONIDAZOLE 500 MG: 500 INJECTION, SOLUTION INTRAVENOUS at 14:03

## 2019-01-01 RX ADMIN — LOVASTATIN 20 MG: 20 TABLET ORAL at 20:40

## 2019-01-01 RX ADMIN — CEFDINIR 300 MG: 250 POWDER, FOR SUSPENSION ORAL at 06:23

## 2019-01-01 RX ADMIN — METRONIDAZOLE 500 MG: 500 INJECTION, SOLUTION INTRAVENOUS at 07:30

## 2019-01-01 RX ADMIN — PROCHLORPERAZINE EDISYLATE 10 MG: 5 INJECTION INTRAMUSCULAR; INTRAVENOUS at 09:22

## 2019-01-01 RX ADMIN — SUCRALFATE 1 G: 1 SUSPENSION ORAL at 01:04

## 2019-01-01 RX ADMIN — AMIODARONE HYDROCHLORIDE 200 MG: 200 TABLET ORAL at 05:28

## 2019-01-01 RX ADMIN — SUCRALFATE 1 G: 1 SUSPENSION ORAL at 13:18

## 2019-01-01 RX ADMIN — SODIUM CHLORIDE: 9 INJECTION, SOLUTION INTRAVENOUS at 18:50

## 2019-01-01 RX ADMIN — ACETAMINOPHEN 650 MG: 325 TABLET, FILM COATED ORAL at 01:00

## 2019-01-01 RX ADMIN — WARFARIN SODIUM 4 MG: 4 TABLET ORAL at 21:41

## 2019-01-01 RX ADMIN — CEFTRIAXONE SODIUM 1 G: 1 INJECTION, POWDER, FOR SOLUTION INTRAMUSCULAR; INTRAVENOUS at 05:50

## 2019-01-01 RX ADMIN — SODIUM CHLORIDE: 9 INJECTION, SOLUTION INTRAVENOUS at 08:15

## 2019-01-01 RX ADMIN — SODIUM CHLORIDE: 9 INJECTION, SOLUTION INTRAVENOUS at 09:17

## 2019-01-01 RX ADMIN — LEVOTHYROXINE SODIUM 100 MCG: 100 TABLET ORAL at 06:09

## 2019-01-01 ASSESSMENT — COGNITIVE AND FUNCTIONAL STATUS - GENERAL
DRESSING REGULAR LOWER BODY CLOTHING: A LITTLE
DAILY ACTIVITIY SCORE: 21
MOBILITY SCORE: 12
MOBILITY SCORE: 18
MOVING TO AND FROM BED TO CHAIR: UNABLE
EATING MEALS: A LITTLE
HELP NEEDED FOR BATHING: A LOT
STANDING UP FROM CHAIR USING ARMS: A LITTLE
DAILY ACTIVITIY SCORE: 17
WALKING IN HOSPITAL ROOM: A LITTLE
SUGGESTED CMS G CODE MODIFIER MOBILITY: CL
CLIMB 3 TO 5 STEPS WITH RAILING: A LITTLE
DRESSING REGULAR LOWER BODY CLOTHING: A LITTLE
SUGGESTED CMS G CODE MODIFIER MOBILITY: CL
TOILETING: A LITTLE
DRESSING REGULAR LOWER BODY CLOTHING: A LITTLE
DRESSING REGULAR UPPER BODY CLOTHING: A LITTLE
CLIMB 3 TO 5 STEPS WITH RAILING: A LOT
DRESSING REGULAR UPPER BODY CLOTHING: A LITTLE
WALKING IN HOSPITAL ROOM: A LITTLE
TURNING FROM BACK TO SIDE WHILE IN FLAT BAD: A LITTLE
PERSONAL GROOMING: A LITTLE
MOVING FROM LYING ON BACK TO SITTING ON SIDE OF FLAT BED: UNABLE
HELP NEEDED FOR BATHING: A LITTLE
MOVING TO AND FROM BED TO CHAIR: A LITTLE
MOVING TO AND FROM BED TO CHAIR: UNABLE
SUGGESTED CMS G CODE MODIFIER MOBILITY: CK
MOVING FROM LYING ON BACK TO SITTING ON SIDE OF FLAT BED: A LITTLE
PERSONAL GROOMING: A LITTLE
SUGGESTED CMS G CODE MODIFIER DAILY ACTIVITY: CJ
SUGGESTED CMS G CODE MODIFIER DAILY ACTIVITY: CK
WALKING IN HOSPITAL ROOM: A LITTLE
SUGGESTED CMS G CODE MODIFIER DAILY ACTIVITY: CK
STANDING UP FROM CHAIR USING ARMS: A LITTLE
TOILETING: A LITTLE
DAILY ACTIVITIY SCORE: 19
STANDING UP FROM CHAIR USING ARMS: A LITTLE
TURNING FROM BACK TO SIDE WHILE IN FLAT BAD: A LOT
MOBILITY SCORE: 12
PERSONAL GROOMING: A LITTLE
MOVING FROM LYING ON BACK TO SITTING ON SIDE OF FLAT BED: UNABLE
TURNING FROM BACK TO SIDE WHILE IN FLAT BAD: UNABLE
HELP NEEDED FOR BATHING: A LITTLE
CLIMB 3 TO 5 STEPS WITH RAILING: A LITTLE

## 2019-01-01 ASSESSMENT — PATIENT HEALTH QUESTIONNAIRE - PHQ9
2. FEELING DOWN, DEPRESSED, IRRITABLE, OR HOPELESS: NOT AT ALL
SUM OF ALL RESPONSES TO PHQ9 QUESTIONS 1 AND 2: 0
1. LITTLE INTEREST OR PLEASURE IN DOING THINGS: NOT AT ALL

## 2019-01-01 ASSESSMENT — ENCOUNTER SYMPTOMS
NAUSEA: 1
HEARTBURN: 0
NEUROLOGICAL NEGATIVE: 1
SHORTNESS OF BREATH: 0
BRUISES/BLEEDS EASILY: 0
ABDOMINAL PAIN: 0
EYES NEGATIVE: 1
HEMOPTYSIS: 0
EYES NEGATIVE: 1
EYES NEGATIVE: 1
MYALGIAS: 0
CHILLS: 0
MYALGIAS: 1
HALLUCINATIONS: 0
FEVER: 0
DOUBLE VISION: 0
SENSORY CHANGE: 0
BRUISES/BLEEDS EASILY: 0
HEARTBURN: 1
FEVER: 0
BRUISES/BLEEDS EASILY: 0
CHILLS: 0
EYE DISCHARGE: 0
PSYCHIATRIC NEGATIVE: 1
EYES NEGATIVE: 1
WEAKNESS: 0
WEAKNESS: 1
VOMITING: 0
VOMITING: 0
DIZZINESS: 0
MYALGIAS: 0
NAUSEA: 1
CHILLS: 0
CARDIOVASCULAR NEGATIVE: 1
MYALGIAS: 1
NAUSEA: 1
ABDOMINAL PAIN: 0
PSYCHIATRIC NEGATIVE: 1
PSYCHIATRIC NEGATIVE: 1
RESPIRATORY NEGATIVE: 1
NEUROLOGICAL NEGATIVE: 1
VOMITING: 0
MYALGIAS: 1
PSYCHIATRIC NEGATIVE: 1
CARDIOVASCULAR NEGATIVE: 1
DEPRESSION: 0
HEARTBURN: 0
RESPIRATORY NEGATIVE: 1
ABDOMINAL PAIN: 0
FEVER: 0
BRUISES/BLEEDS EASILY: 0
SPEECH CHANGE: 0
CHILLS: 0
FLANK PAIN: 0
BRUISES/BLEEDS EASILY: 0
COUGH: 0
BLURRED VISION: 0
RESPIRATORY NEGATIVE: 1
ABDOMINAL PAIN: 0
CARDIOVASCULAR NEGATIVE: 1
FEVER: 0
FEVER: 0
FOCAL WEAKNESS: 0
NAUSEA: 0
ABDOMINAL PAIN: 0
VOMITING: 1
CARDIOVASCULAR NEGATIVE: 1
PALPITATIONS: 0
VOMITING: 0
NAUSEA: 1
RESPIRATORY NEGATIVE: 1
CHILLS: 0
WEAKNESS: 1

## 2019-01-01 ASSESSMENT — CHA2DS2 SCORE
CHA2DS2 VASC SCORE: 4
HYPERTENSION: YES
AGE 65 TO 74: NO
CHF OR LEFT VENTRICULAR DYSFUNCTION: NO
SEX: FEMALE
VASCULAR DISEASE: NO
PRIOR STROKE OR TIA OR THROMBOEMBOLISM: NO
AGE 75 OR GREATER: YES
DIABETES: NO

## 2019-01-01 ASSESSMENT — LIFESTYLE VARIABLES
HOW MANY TIMES IN THE PAST YEAR HAVE YOU HAD 5 OR MORE DRINKS IN A DAY: 0
TOTAL SCORE: 0
CONSUMPTION TOTAL: NEGATIVE
SUBSTANCE_ABUSE: 0
EVER FELT BAD OR GUILTY ABOUT YOUR DRINKING: NO
AVERAGE NUMBER OF DAYS PER WEEK YOU HAVE A DRINK CONTAINING ALCOHOL: 0
ON A TYPICAL DAY WHEN YOU DRINK ALCOHOL HOW MANY DRINKS DO YOU HAVE: 0
HAVE PEOPLE ANNOYED YOU BY CRITICIZING YOUR DRINKING: NO
EVER HAD A DRINK FIRST THING IN THE MORNING TO STEADY YOUR NERVES TO GET RID OF A HANGOVER: NO
HAVE YOU EVER FELT YOU SHOULD CUT DOWN ON YOUR DRINKING: NO
ALCOHOL_USE: NO

## 2019-01-01 ASSESSMENT — ACTIVITIES OF DAILY LIVING (ADL): TOILETING: INDEPENDENT

## 2019-01-01 ASSESSMENT — GAIT ASSESSMENTS
DEVIATION: OTHER (COMMENT)
ASSISTIVE DEVICE: FRONT WHEEL WALKER
GAIT LEVEL OF ASSIST: MINIMAL ASSIST
DISTANCE (FEET): 100

## 2019-10-08 PROBLEM — R79.89 ELEVATED TROPONIN: Status: ACTIVE | Noted: 2019-01-01

## 2019-10-08 PROBLEM — R65.10 SIRS (SYSTEMIC INFLAMMATORY RESPONSE SYNDROME) (HCC): Status: ACTIVE | Noted: 2019-01-01

## 2019-10-08 PROBLEM — I95.9 HYPOTENSION: Status: ACTIVE | Noted: 2019-01-01

## 2019-10-08 PROBLEM — N17.9 ACUTE RENAL FAILURE (ARF) (HCC): Status: ACTIVE | Noted: 2019-01-01

## 2019-10-08 PROBLEM — E83.51 HYPOCALCEMIA: Status: ACTIVE | Noted: 2019-01-01

## 2019-10-09 PROBLEM — R13.12 OROPHARYNGEAL DYSPHAGIA: Status: ACTIVE | Noted: 2019-01-01

## 2019-10-09 PROBLEM — J69.0 ASPIRATION PNEUMONIA (HCC): Status: ACTIVE | Noted: 2019-01-01

## 2019-10-09 PROBLEM — E43 SEVERE PROTEIN-CALORIE MALNUTRITION (HCC): Status: ACTIVE | Noted: 2019-01-01

## 2019-10-09 PROBLEM — I10 ESSENTIAL HYPERTENSION: Status: ACTIVE | Noted: 2019-01-01

## 2019-10-09 PROBLEM — A41.9 SEPSIS (HCC): Status: ACTIVE | Noted: 2019-01-01

## 2019-10-09 PROBLEM — E87.1 HYPONATREMIA: Status: ACTIVE | Noted: 2019-01-01

## 2019-10-09 PROBLEM — R11.2 NAUSEA AND VOMITING: Status: ACTIVE | Noted: 2019-01-01

## 2019-10-09 NOTE — PROGRESS NOTES
2 RN skin check completed with ARMANDO Lima.   Devices in place: PIV, femoral triple lumen CVC, NC telebox, SCDs.  Skin assessed under devices: yes.  Confirmed pressure ulcers found on: no.  New potential pressure ulcers noted on: no. Wound consult placed: n/a.  The following interventions in place: turns and repositions self, encouraged and educated on importance, moisturizer, barrier cream, silicone nasal canula, gray foam pads, pillows for support and positioning.     Bilat ears and elbows pink, intact and blanching.  Mastectomy R breast.  Large hernia to abd.  R femoral CVC to groin.  Bilat heels intact, pink, blanching, calloused and dry.   Sacrum intact and blanching.

## 2019-10-09 NOTE — ASSESSMENT & PLAN NOTE
This is likely due to severe dehydration in setting of vomiting for 7 days     Resolved   Stop ivf    Renal ultrasound  Shows atrophic kidneys- should be repeated in 6 months      Check am bmp

## 2019-10-09 NOTE — ASSESSMENT & PLAN NOTE
SIRS criteria identified on my evaluation include:  Tachypnea, with respirations greater than 20 per minute and Leukocyosis, with WBC greater than 12,000  Source appears to be aspiration pneumonia    Iv abx iv clindamycin started on 10/9..changed to iv rocephin and flagyl on 1/10        Lost iv access on 10/12--> change to po omnicef and po flagyl

## 2019-10-09 NOTE — DIETARY
"Nutrition services: Day 1 of admit.  Jami Kerr is a 86 y.o. female with admitting DX of Hypotension  Consult received for Supplements, potential weight loss    Visit with pt at bedside. Pt appeared thin. Pt reported  lb (unsure how long ago), and poor PO eating very small meals at baseline. Pt agreeable to supplements (Boost Plus), ok per MD.     Assessment:  Height: 167.6 cm (5' 6\")  Weight: 52.8 kg (116 lb 6.5 oz)  Body mass index is 18.79 kg/m²., BMI classification: Normal/Underweight  Diet/Intake: Regular, 50-75% breakfast today    Evaluation:   1. PMH: hypertension, hypothyroidism  2. C/o Nausea/vomiting PTA per H&P, no emesis reported since admit  3. No weight hx available per chart review, potential 13.4% weight loss over unknown period of time with poor PO (pt unable to quantify or state length of time)   4. Labs: Na 133, K 3.3, Glu 101, , Creat 2.44, GFR 19, Mg/Phos WNL  5. Meds: Kdur (Completed)    Malnutrition Risk: Unable to determine based on pt report    Recommendations/Plan:  1. Boost Plus BID between meals   2. Encourage intake of meals and supplements  3. Document intake of all meals  as % taken in ADL's to provide interdisciplinary communication across all shifts.   4. Monitor weight.  5. Nutrition rep will continue to see patient for ongoing meal and snack preferences.     RD following            "

## 2019-10-09 NOTE — THERAPY
Physical Therapy Contact Note    Pt consult received, session attempted; pt politely declining 2/2 fatigue, not sleeping and just starting to eat; discussed diet as it relates to activity tolerance and slow progression; pt did state she was able to mobilize to restroom and felt relatively normal; will return for full evaluation when able;     Bettye Light, PT, DPT Pager: [phone]

## 2019-10-09 NOTE — ED PROVIDER NOTES
ED Provider Note    CHIEF COMPLAINT  Chief Complaint   Patient presents with   • N/V     Patient transferred from [facility] after family reported patient n/v x7 days, patient was hypotensive at [facility] medicated pta with 3L fluid.    • Hypotension       HPI  Jami Kerr is a 86 y.o. female who presents via transfer for evaluation of dehydration and hypotension.  The patient has been vomiting for a week, finally family is able to convince her to go to the hospital where she had blood work revealing acute renal failure and she was noted to be hypotensive despite resuscitation with IV fluids.  The patient states that she has not vomited today but the preceding 7 days have been pretty bad.  She reports vomiting many times a day.  No diarrhea, no abdominal pain, no fever, no chest pain or shortness of breath.  At the outside facility a central line was placed but pressors were not initiated.  She denies any other complaints    REVIEW OF SYSTEMS  Negative for fever, rash, chest pain, dyspnea, abdominal pain, diarrhea, headache, focal weakness, focal numbness, focal tingling. All other systems are negative.     PAST MEDICAL HISTORY  Past Medical History:   Diagnosis Date   • Cancer (HCC) 2009    right breast   • CATARACT     both eyes   • Dental disorder     dentures uppers   • Hypertension    • Unspecified disorder of thyroid     hypothyroid       FAMILY HISTORY  History reviewed. No pertinent family history.    SOCIAL HISTORY  Social History     Tobacco Use   • Smoking status: Former Smoker     Packs/day: 0.50     Years: 60.00     Pack years: 30.00     Types: Cigarettes     Last attempt to quit: 2010     Years since quittin.1   • Smokeless tobacco: Never Used   Substance Use Topics   • Alcohol use: No     Comment: rarely   • Drug use: No       SURGICAL HISTORY  Past Surgical History:   Procedure Laterality Date   • BONE MARROW ASPIRATION  2012    Performed by NILSA HERNANDEZ at ENDOSCOPY [facility]  "ORS   • BONE MARROW BIOPSY, NDL/TROCAR  1/4/2012    Performed by NILSA HERNANDEZ at ENDOSCOPY [facility] ORS   • AORTOBIFEM BYPASS  12/27/2011    Performed by RHETT MILLARD at SURGERY [facility] ORS   • RECOVERY  12/2/2011    Performed by SURGERY, IR-RECOVERY at SURGERY SAME DAY [facility] ORS   • CATARACT EXTRACTION WITH IOL  2010    jose daniel eyes   • MASTECTOMY  10/6/2009    Performed by RHETT MILLARD at SURGERY [facility] ORS   • NODE BIOPSY SENTINEL  10/6/2009    Performed by RHETT MILLARD at SURGERY [facility] ORS   • 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     ALLERGIES  Allergies   Allergen Reactions   • Pcn [Penicillins] Hives              MEDICAL RECORD  I have reviewed patient's medical record and pertinent results are listed above.      PHYSICAL EXAM  VITAL SIGNS: BP (!) 73/54   Pulse 80   Temp 37.1 °C (98.7 °F) (Temporal)   Resp (!) 23   Ht 1.702 m (5' 7.01\")   Wt 78.6 kg (173 lb 4.5 oz)   SpO2 98%   BMI 27.13 kg/m²    Constitutional: Elderly, frail and cachectic  HENT: Mucus membranes dry.  Eyes: No scleral icterus. Normal conjunctiva   Neck: Supple, comfortable, nonpainful range of motion.   Cardiovascular: Regular heart rate and rhythm.  Central line and right femoral vein  Thorax & Lungs: Chest is nontender.  Lungs are clear to auscultation with good air movement bilaterally.  No wheeze, rhonchi, nor rales.   Abdomen: Soft, nondistended.  Large ventral hernia without tenderness  Skin: Warm, dry. No rash appreciated  Extremities/Musculoskeletal: No sign of trauma. No asymmetric calf tenderness, erythema or edema. Normal range of motion     DIAGNOSTIC STUDIES / PROCEDURES    LABS/EKGs  Results for orders placed or performed during the hospital " encounter of 10/08/19   CBC WITH DIFFERENTIAL   Result Value Ref Range    WBC 20.5 (H) 4.8 - 10.8 K/uL    RBC 5.06 4.20 - 5.40 M/uL    Hemoglobin 13.2 12.0 - 16.0 g/dL    Hematocrit 39.6 37.0 - 47.0 %    MCV 78.3 (L) 81.4 - 97.8 fL    MCH 26.1 (L) 27.0 - 33.0 pg    MCHC 33.3 (L) 33.6 - 35.0 g/dL    RDW 46.3 35.9 - 50.0 fL    Platelet Count 227 164 - 446 K/uL    MPV 10.8 9.0 - 12.9 fL    Neutrophils-Polys 78.70 (H) 44.00 - 72.00 %    Lymphocytes 12.40 (L) 22.00 - 41.00 %    Monocytes 0.90 0.00 - 13.40 %    Eosinophils 0.90 0.00 - 6.90 %    Basophils 0.00 0.00 - 1.80 %    Nucleated RBC 0.00 /100 WBC    Neutrophils (Absolute) 17.59 (H) 2.00 - 7.15 K/uL    Lymphs (Absolute) 2.54 1.00 - 4.80 K/uL    Monos (Absolute) 0.18 0.00 - 0.85 K/uL    Eos (Absolute) 0.18 0.00 - 0.51 K/uL    Baso (Absolute) 0.00 0.00 - 0.12 K/uL    NRBC (Absolute) 0.00 K/uL    Anisocytosis 2+     Macrocytosis 1+    COMP METABOLIC PANEL   Result Value Ref Range    Sodium 133 (L) 135 - 145 mmol/L    Potassium 3.7 3.6 - 5.5 mmol/L    Chloride 96 96 - 112 mmol/L    Co2 21 20 - 33 mmol/L    Anion Gap 16.0 (H) 0.0 - 11.9    Glucose 85 65 - 99 mg/dL    Bun 121 (HH) 8 - 22 mg/dL    Creatinine 3.01 (H) 0.50 - 1.40 mg/dL    Calcium 6.7 (LL) 8.5 - 10.5 mg/dL    AST(SGOT) 25 12 - 45 U/L    ALT(SGPT) 15 2 - 50 U/L    Alkaline Phosphatase 88 30 - 99 U/L    Total Bilirubin 1.5 0.1 - 1.5 mg/dL    Albumin 3.3 3.2 - 4.9 g/dL    Total Protein 5.4 (L) 6.0 - 8.2 g/dL    Globulin 2.1 1.9 - 3.5 g/dL    A-G Ratio 1.6 g/dL   TROPONIN   Result Value Ref Range    Troponin T 24 (H) 6 - 19 ng/L   LACTIC ACID   Result Value Ref Range    Lactic Acid 1.7 0.5 - 2.0 mmol/L   ESTIMATED GFR   Result Value Ref Range    GFR If  18 (A) >60 mL/min/1.73 m 2    GFR If Non African American 15 (A) >60 mL/min/1.73 m 2   DIFFERENTIAL MANUAL   Result Value Ref Range    Bands-Stabs 7.10 0.00 - 10.00 %    Manual Diff Status PERFORMED    PERIPHERAL SMEAR REVIEW   Result Value Ref  Range    Peripheral Smear Review see below    PLATELET ESTIMATE   Result Value Ref Range    Plt Estimation Normal    MORPHOLOGY   Result Value Ref Range    RBC Morphology Present     Poikilocytosis 3+     Echinocytes 2+     Toxic Gran Slight    EKG (NOW)   Result Value Ref Range    Report       [facility] Emergency Dept.    Test Date:  2019-10-08  Pt Name:    DELLA WADDELL                  Department: ER  MRN:        [id number]                      Room:       [facility]  Gender:     Female                       Technician: [id number]  :        [id number]                   Requested By:ER TRIAGE PROTOCOL  Order #:    [id number]                    Reading MD: RADHA EPPERSON MD    Measurements  Intervals                                Axis  Rate:       82                           P:          80  VA:         156                          QRS:        1  QRSD:       82                           T:          268  QT:         408  QTc:        477    Interpretive Statements  12 Lead EKG interpreted by me to show: -- Rate 82-- Rhythm: Normal sinus  rhythm --  -- Axis: Normal -- VA and QRS Intervals: Normal -- T waves: Inversion lead  V3, V4  and V5 -- ST segments: Nonspecific V5 and V6 -- Ectopy: None. My impression  of  this EKG: Concerning for ischemia with anterolateral T  wave inversions and  some  lateral nonspecific ST morphologies  Electronically Signed On 10-8-2019 22:56:56 PDT by RADHA EPPERSON MD          COURSE & MEDICAL DECISION MAKING  I have reviewed any medical record information, laboratory studies and radiographic results as noted above.  Differential diagnoses includes: Dehydration, electrolyte abnormalities, anemia, hypotension, sepsis    Encounter Summary: This is a 86 y.o. female with vomiting for 7 days, presents hypotensive to an outside emergency facility, has no other complaints at all really, found of acute renal failure and leukocytosis.  Upon evaluation here she is initially  hypotensive, she was treated with 3 L of crystalloid at the outside facility, will initiate infusion of a 4th liter here in the emergency department but levo fed will be started as well.  Repeat blood work reveals improving renal dysfunction, also pretty impressive leukocytosis greater than 20,000.  Her lactic acid is reassuringly normal.  I will treat her with broad-spectrum antibiotics in the form of Rocephin, she will be admitted to the hospital in guarded condition    HYDRATION: Based on the patient's presentation of Hypotension the patient was given IV fluids. IV Hydration was used because oral hydration was not adequate alone. Upon recheck following hydration, the patient was Guarded.       CRITICAL CARE  The very real possibilty of a deterioration of this patient's condition required the highest level of my preparedness for sudden, emergent intervention.  I provided critical care services, which included medication orders, frequent reevaluations of the patient's condition and response to treatment, ordering and reviewing test results, and discussing the case with various consultants.  The critical care time associated with the care of the patient was 41 minutes. Review chart for interventions. This time is exclusive of any other billable procedures.           DISPOSITION: Admit in guarded condition      FINAL IMPRESSION  1. Hypotension due to hypovolemia    2. Nausea and vomiting, intractability of vomiting not specified, unspecified vomiting type    3. Dehydration    4. FAHEEM (acute kidney injury) (HCC)           This dictation was created using voice recognition software. The accuracy of the dictation is limited to the abilities of the software. I expect there may be some errors of grammar and possibly content. The nursing notes were reviewed and certain aspects of this information were incorporated into this note.    Electronically signed by: Dmitry William, 10/8/2019 10:57 PM

## 2019-10-09 NOTE — THERAPY
"Occupational Therapy Evaluation completed.   Functional Status:  Pt presents to skilled OT services N/V admitted with hypotension, ARF and hyponatremia, h/o HTN and hypothyroidism. Pt performed bed mobility with min a, LB dressing min a for initiating threading of R sock, ambulated to BR with cga/min a for safety using FWW, toileting supervision, min a for toilet t/f; intermittent vc's required to attempt tasks for self, no c/o dizziness, N/V during session. Pt c/o being tired and limited energy d/t limited sleep. Pt demonstrates generalized weakness and strength deficits anticipate with increased mobilization and PO intake pt will be able to d/c home with [facility]. Will continue to monitor.   Plan of Care: Will benefit from Occupational Therapy 3 times per week  Discharge Recommendations:  Equipment: Will Continue to Assess for Equipment Needs. Post-acute therapy Recommend home health transitional care for continued occupational therapy services pending progress with therapy.       See \"Rehab Therapy-Acute\" Patient Summary Report for complete documentation.    "

## 2019-10-09 NOTE — H&P
Hospital Medicine History & Physical Note    Date of Service  10/8/2019    Primary Care Physician  Pcp Not In Computer    Consultants  none    Code Status  full    Chief Complaint  N/v     History of Presenting Illness  86 y.o. female who presented 10/8/2019 with past medical history of hypertension, hypothyroidism who presents with nausea and vomiting.  This patient started having nausea and vomiting over the last week.  Progressively worsening.  She had multiple episodes of vomiting.  No diarrhea.  No fever no chills no sweats.  No dysuria.  No cough no shortness of breath.  Otherwise she has not had alleviating or exacerbating factors to her symptoms.  She was found to have significant hypotension at outside hospital thought to be due to significant dehydration with associated acute renal failure.  Transferred to [facility] for further management of her symptoms.      Vitals from outside hospital blood pressure 66/32 pulse 81 temperature 99.2 91% on room air upon review of outside hospital records patient's WBC count 23.6 hemoglobin 15.3 platelet count 272 sodium 128 potassium 4.3 chloride 83 CO2 25 mg 24 glucose 111  creatinine 4.68 BUN to creatinine ratio 33.8 AST 42 ALT unremarkable total bilirubin 1.8 alkaline phosphatase 138 otherwise LFTs unremarkable PT 10.7 INR 1.0 troponin 0.00 urinalysis is unremarkable CT head without contrast mild cerebral atrophy consistent with patient's age.  There may be a small area of encephalomalacia right frontal region suggesting an area of previous infarction.  Outside hospital chest x-ray no acute pulmonary or pulmonary process    Review of Systems  Review of Systems   Constitutional: Negative for chills and fever.   HENT: Negative for congestion, hearing loss and tinnitus.    Eyes: Negative for blurred vision, double vision and discharge.   Respiratory: Negative for cough, hemoptysis and shortness of breath.    Cardiovascular: Negative for chest pain, palpitations and  leg swelling.   Gastrointestinal: Positive for nausea and vomiting. Negative for abdominal pain and heartburn.   Genitourinary: Negative for dysuria and flank pain.   Musculoskeletal: Negative for joint pain and myalgias.   Skin: Negative for rash.   Neurological: Negative for dizziness, sensory change, speech change, focal weakness and weakness.   Endo/Heme/Allergies: Negative for environmental allergies. Does not bruise/bleed easily.   Psychiatric/Behavioral: Negative for depression, hallucinations and substance abuse.       Past Medical History   has a past medical history of Cancer (HCC) (2009), CATARACT, Dental disorder, Hypertension, and Unspecified disorder of thyroid. She also has no past medical history of Infectious disease.    Surgical History   has a past surgical history that includes other neurological surg; mastectomy (10/6/2009); node biopsy sentinel (10/6/2009); cataract extraction with iol (2010); recovery (12/2/2011); other; other (2002); aortobifem bypass (12/27/2011); bone marrow aspiration (1/4/2012); and bone marrow biopsy, ndl/trocar (1/4/2012).     Family History  Reviewed and not pertinent     Social History   reports that she quit smoking about 9 years ago. Her smoking use included cigarettes. She has a 30.00 pack-year smoking history. She has never used smokeless tobacco. She reports that she does not drink alcohol or use drugs.    Allergies  Allergies   Allergen Reactions   • Pcn [Penicillins] Hives              Medications  Prior to Admission Medications   Prescriptions Last Dose Informant Patient Reported? Taking?   Multiple Vitamins-Calcium (ONE-A-DAY WOMENS FORMULA PO)   Yes No   Sig: Take  by mouth every day.   alendronate (FOSAMAX) 70 MG TABS   Yes No   Sig: Take 70 mg by mouth every 7 days.   amlodipine (NORVASC) 10 MG TABS   Yes No   Sig: Take  by mouth every day.   anastrozole (ARIMIDEX) 1 MG TABS   Yes No   Sig: Take 1 mg by mouth every day.   hydrocodone-acetaminophen  (VICODIN) 5-500 MG TABS   Yes No   Sig: Take 1-2 Tabs by mouth every four hours as needed.   levothyroxine (SYNTHROID) 100 MCG TABS   Yes No   Sig: Take 100 mcg by mouth every day.   lovastatin (MEVACOR) 20 MG TABS   Yes No   Sig: Take 20 mg by mouth every evening.      Facility-Administered Medications: None       Physical Exam  Temp:  [37.1 °C (98.7 °F)] 37.1 °C (98.7 °F)  Pulse:  [80-89] 85  Resp:  [16-23] 16  BP: ()/(50-94) 129/63  SpO2:  [98 %-100 %] 100 %    Physical Exam   Constitutional: She is oriented to person, place, and time. She appears well-developed and well-nourished. She appears distressed.   HENT:   Head: Normocephalic and atraumatic.   Dry oral mucosa    Eyes: Pupils are equal, round, and reactive to light. Conjunctivae and EOM are normal.   Neck: Normal range of motion. Neck supple. No JVD present.   Cardiovascular: Normal rate, regular rhythm, normal heart sounds and intact distal pulses.   No murmur heard.  Pulmonary/Chest: Effort normal and breath sounds normal. No respiratory distress. She has no wheezes.   Abdominal: Soft. Bowel sounds are normal. She exhibits no distension. There is no tenderness.   Mild mid epigastric ttp    Musculoskeletal: Normal range of motion. She exhibits no edema.   Neurological: She is alert and oriented to person, place, and time. She exhibits normal muscle tone.   Skin: Skin is warm and dry.   Psychiatric: She has a normal mood and affect. Her behavior is normal. Judgment and thought content normal.   Nursing note and vitals reviewed.      Laboratory:  Recent Labs     10/08/19  2135   WBC 20.5*   RBC 5.06   HEMOGLOBIN 13.2   HEMATOCRIT 39.6   MCV 78.3*   MCH 26.1*   MCHC 33.3*   RDW 46.3   PLATELETCT 227   MPV 10.8     Recent Labs     10/08/19  2135   SODIUM 133*   POTASSIUM 3.7   CHLORIDE 96   CO2 21   GLUCOSE 85   *   CREATININE 3.01*   CALCIUM 6.7*     Recent Labs     10/08/19  2135   ALTSGPT 15   ASTSGOT 25   ALKPHOSPHAT 88   TBILIRUBIN 1.5    GLUCOSE 85         No results for input(s): NTPROBNP in the last 72 hours.      Recent Labs     10/08/19  2135   TROPONINT 24*       Urinalysis:    No results found     Imaging:  US-RENAL    (Results Pending)         Assessment/Plan:  I anticipate this patient will require at least two midnights for appropriate medical management, necessitating inpatient admission.    * Hypotension  Assessment & Plan  This is likely hypovolemic and associated with dehydration from n/v   Cont with aggressive IVF  Improved with IVF, will check cortisol level as well   May need pressor support if continues to be hypotensive she does have central line from OSH will need to remove in am if pressures improve    Acute renal failure (ARF) (HCC)  Assessment & Plan  This is likely due to severe dehydration in setting of vomiting for 7 days   Cont with IVF  Check urine lytes  Renal ultrasound   Consider nephro consult if no improvement of symptoms       Hyponatremia  Assessment & Plan  Hypovolemic, cont with IVF and recheck labs    Nausea and vomiting  Assessment & Plan  This has improved, no vomiting while in the ER   If persists may need CT abd for further evaluation     Elevated troponin  Assessment & Plan  This is likely demand from hypotension   Cont with cardiac monitoring and serial troponins     SIRS (systemic inflammatory response syndrome) (HCC)  Assessment & Plan  SIRS criteria identified on my evaluation include:  Tachypnea, with respirations greater than 20 per minute and Leukocyosis, with WBC greater than 12,000  SIRS is non-infectious, the patient does not have sepsis this is likely reactive from N/V       Hypocalcemia  Assessment & Plan  Replace and recheck labs    History of breast cancer- (present on admission)  Assessment & Plan  Hx of s/p treatment    Hypothyroid- (present on admission)  Assessment & Plan  Resume home levothyroxine       VTE prophylaxis: heparin     I spent a total of 30 minutes of non face to face time  performing additional research, reviewing old medical records, provided on going management by following up on labs, placing orders, discussing plan of care with other healthcare providers and nursing staff. Start time: 11:15 pm. End time: 11:45 pm

## 2019-10-09 NOTE — CARE PLAN
Problem: Communication  Goal: The ability to communicate needs accurately and effectively will improve  Outcome: PROGRESSING AS EXPECTED  Note:   Call light within reach. Educated pt to use call light as needed. Reorient and re-educate as needed. Continue to monitor.       Problem: Safety  Goal: Will remain free from falls  Outcome: PROGRESSING AS EXPECTED  Note:   Treaded socks and bed/strip alarm on, side rails up x 3. Call light within reach. Pt educated to call for assistance. Reinforce as needed. Continue to monitor.         Problem: Venous Thromboembolism (VTW)/Deep Vein Thrombosis (DVT) Prevention:  Goal: Patient will participate in Venous Thrombosis (VTE)/Deep Vein Thrombosis (DVT)Prevention Measures  Outcome: PROGRESSING AS EXPECTED  Note:   Assess and monitor for anticoagulation complications. Educate on importance of VTE prophylaxis. Encourage use of SCDs as ordered, ROM and ambulation, as tolerated. Continue to monitor.        Problem: Skin Integrity  Goal: Risk for impaired skin integrity will decrease  Outcome: PROGRESSING AS EXPECTED  Note:   Assess skin and monitor for skin breakdown. Alleviate pressure to bony prominences and provide assistance with turning, repositioning, ROM and mobility as appropriate. Use of silicone nasal canula, gray foam pads, barrier cream and moisturizer as needed. Continue to monitor.

## 2019-10-09 NOTE — PROGRESS NOTES
Assumed care of patient and bedside report received from previous RN. Plan of care discussed with patient. All concerns voiced at this time. Patient is alert and oriented x 3-4 (disoriented to time and forgetful). VSS. Patient educated on importance of calling, bed controls on, bed locked and in lowest position, call light with patient. Patient is in rhythm: SR.

## 2019-10-09 NOTE — PROGRESS NOTES
Received report from BRANDON Shabazz RN. Pt very tired. No report of pain. Pt transported via gurney with ACLS RN and zoll monitor on. Tele box on pt, pt's rhythm is SR 93.  Pt oriented to call light and unit routine, verbalized understanding. Pt transferred to unit with belongings. Call light and bedside table within reach. Fall precautions in place. Will continue to monitor.

## 2019-10-09 NOTE — CARE PLAN
Problem: Safety  Goal: Will remain free from injury  Outcome: PROGRESSING AS EXPECTED     Problem: Knowledge Deficit  Goal: Knowledge of disease process/condition, treatment plan, diagnostic tests, and medications will improve  Outcome: PROGRESSING AS EXPECTED     Problem: Fluid Volume:  Goal: Will maintain balanced intake and output  Outcome: PROGRESSING AS EXPECTED

## 2019-10-10 PROBLEM — R13.10 ODYNOPHAGIA: Status: ACTIVE | Noted: 2019-01-01

## 2019-10-10 NOTE — PROGRESS NOTES
Received report from day shift RNAislinn. Pt resting comfortably in bed and does not appear to be in any distress. Bed in lowest locked position, call light and personal belongings within reach. Plan of care discussed, white board updated. No further questions at this time.

## 2019-10-10 NOTE — PROGRESS NOTES
Assumed care at 0700, bedside report received from day shift RN. Pt. Is SR on the monitor. Initial assessment completed, orders reviewed, call light within reach, bed alarm is in use, and hourly rounding in place. POC addressed with patient, no additional questions at this time.

## 2019-10-10 NOTE — THERAPY
"Physical Therapy Evaluation completed.   Bed Mobility:  Supine to Sit: (NT, sitting EOB upon entry)  Transfers: Sit to Stand: Minimal Assist  Gait: Level Of Assist: Minimal Assist (for safety) with Front-Wheel Walker       Plan of Care: Will benefit from Physical Therapy 3 times per week  Discharge Recommendations: Equipment: patient has/uses FWW at home. Post-acute therapy: see below.    See \"Rehab Therapy-Acute\" Patient Summary Report for complete documentation.    Patient is a pleasant 87 YO female that was admitted following complaints of nausea, vomiting and hypotension, now with medical dx of ARF. PMHx significant for HTN, hypothyroidism. She presented to PT with functional weakness and decreased activity tolerance which are limiting her ability to safely perform mobility at [facility]. She ambulated approximately 100ft with FWW and min A, distance limited due to fatigue. Unknown level of social support; patient reported she lives with brother but daughter assists with IADLs but differing reports in chart review. If family able to provide min A for mobility anticipate she will be able to return home following medical DC with home health PT; if family not able to assist, recommend post acute placement. Will follow while in house. Recommend continued mobilization with ancillary staff as able.  "

## 2019-10-10 NOTE — DOCUMENTATION QUERY
[facility]                                                                       Query Response Note      PATIENT:               DELLA WADDELL  ACCT #:                  [id number]  MRN:                     [id number]  :                      1933  ADMIT DATE:       10/8/2019 9:25 PM  DISCH DATE:          RESPONDING  PROVIDER #:        [id number]           QUERY TEXT:    Sepsis and acute kidney failure are documented in the Medical Record. Please clarify the relationship, if any, between sepsis and acute kidney failure.    NOTE: If an appropriate response is not listed below, please respond with a new note.    The patient's Clinical Indicators include:  Per Progress Notes  -likely due to severe dehydration in setting of vomiting for 7 days   -Renal US shows atrophic kidneys    Treatment:   IV NS at 100ml/hr, LR 1000ml bolus, Rocephin, Flagyl, renal US, & monitor labs     Risk Factors:   Sepsis, aspiration pneumonia, acute renal failure, hypotension, severe dehydration, & hyponatremia  Options provided:   -- Acute kidney failure is due to or associated with sepsis   -- Unrelated to each other   -- Unable to determine      Query created by: Yoselin Guerrero on 10/10/2019 1:21 PM    RESPONSE TEXT:    Unrelated to each other          Electronically signed by:  TERE TAVAREZ 10/10/2019 1:27 PM

## 2019-10-10 NOTE — THERAPY
"Speech Language Therapy Clinical Swallow Evaluation completed.  Functional Status: Patient seen for CSE related to concerns for swallowing difficulty. She described her difficulty as stating that liquid \"go down slow\", pointing to her sternum as she stated this. Patient was given a dose of Zofran immediately proceeding the evaluation. [facility] revealed patient is edentulous with generalized weakness and poor range of motion of the tongue, lips, and jaw. She has dentures but they are not present. Swallow palpated as timely and complete. PO trials of ice chips, thin water (spoon, cup sips), nectar thick liquids (cup sips), pureed consistencies, and soft mechanical solids. Patient with no s/sx concerning for aspiration with any trials. No feelings of nausea reported. Some belching x2 with thin liquids. Patient did report that boluses felt like they were \"going down slowly\". She was unable to masticate a soft mechanical solid and eventually spit it out. Discussed diet options with patient and family and we agreed on a Full Liquid diet rather than a Dysphagia 1 diet. If patient gets her dentures, we can reassess for upgrade. Please contact SLP with any difficulty. SLP following. Question esophageal involvement as pharyngeal phase swallow appears functional at bedside.    Recommendations - Diet: Diet / Liquid Recommendation: (Full Liquid)                          Strategies: Assistance needed for meal tray set-up and Head of Bed at 90 Degrees                          Medication Administration: Medication Administration : Float Whole with Puree, Crush all Medications in Puree  Plan of Care: Will benefit from Speech Therapy 3 times per week  Post-Acute Therapy:Recommend outpatient or home health transitional care services for continued speech therapy services      See \"Rehab Therapy-Acute\" Patient Summary Report for complete documentation.   "

## 2019-10-10 NOTE — CARE PLAN
Fall precautions in place. Bed in lowest position. Non-skid socks in place. Personal possessions within reach. Mobility sign on door. Bed-alarm on. Call light within reach. Pt educated regarding fall prevention and states understanding.     Pt educated regarding plan of care and medications. All questions answered.

## 2019-10-10 NOTE — CARE PLAN
Problem: Safety  Goal: Will remain free from injury  Outcome: PROGRESSING AS EXPECTED  Note:   Fall precautions in place. Bed in lowest position. Non-skid socks in place. Personal possessions within reach. Mobility sign on door. Bed-alarm on. Call light within reach. Pt educated regarding fall prevention and states understanding.       Problem: Infection  Goal: Will remain free from infection  Outcome: PROGRESSING AS EXPECTED  Note:   Pt educated on importance of hand hygiene to reduce the risk of infection

## 2019-10-10 NOTE — PROGRESS NOTES
"Hospital Medicine Daily Progress Note    Date of Service  10/9/2019    Chief Complaint  86 y.o. female admitted 10/8/2019 with n.v , low bp    Interval Problem Update  Family states she recently saw an outside MD and that her \"labs \" were all normal. Does not make entire sense    Cr is high 2.44  Low potassium 3.3    Elevated wbc at 19.1    Afebrile    Floor RN has noted coughing with pills and food    Elevated procalcitonin level     I ordered and reviewed cxr- hazing infiltrate right hilar region      Renal US shows both kidneys atrophic    Consultants/Specialty  none    Code Status  full    Disposition  home    Review of Systems  Review of Systems   Constitutional: Negative for chills and fever.   HENT: Negative.    Eyes: Negative.    Respiratory: Negative.    Cardiovascular: Negative.    Gastrointestinal: Positive for nausea. Negative for abdominal pain and vomiting.   Genitourinary: Negative for dysuria, frequency and urgency.   Musculoskeletal: Positive for myalgias.   Skin: Negative.    Neurological: Negative.    Endo/Heme/Allergies: Negative for environmental allergies. Does not bruise/bleed easily.   Psychiatric/Behavioral: Negative.    All other systems reviewed and are negative.       Physical Exam  Temp:  [36.4 °C (97.5 °F)-37.1 °C (98.7 °F)] 36.8 °C (98.3 °F)  Pulse:  [80-96] 96  Resp:  [15-23] 15  BP: ()/(49-94) 116/67  SpO2:  [91 %-100 %] 91 %    Physical Exam   Constitutional: She is oriented to person, place, and time. No distress.   thin   HENT:   Head: Normocephalic and atraumatic.   Mouth/Throat: No oropharyngeal exudate.   Eyes: Pupils are equal, round, and reactive to light. EOM are normal. Left eye exhibits no discharge.   Neck: No JVD present. No tracheal deviation present. No thyromegaly present.   Cardiovascular: Normal rate, regular rhythm and intact distal pulses. Exam reveals no gallop and no friction rub.   No murmur heard.  Pulmonary/Chest: No respiratory distress. She has no " wheezes. She has no rales.   Crackles right base   Abdominal: Soft. Bowel sounds are normal. She exhibits distension (mid abdominal hernia- patient states it has always been like that).   Musculoskeletal: Normal range of motion. She exhibits no edema or deformity.   Neurological: She is alert and oriented to person, place, and time. No cranial nerve deficit. Coordination normal.   Skin: Skin is warm and dry. No rash noted. No erythema.   Psychiatric: She has a normal mood and affect.       Fluids    Intake/Output Summary (Last 24 hours) at 10/9/2019 1743  Last data filed at 10/9/2019 0036  Gross per 24 hour   Intake 1130 ml   Output --   Net 1130 ml       Laboratory  Recent Labs     10/08/19  2135 10/09/19  0240   WBC 20.5* 19.1*   RBC 5.06 4.82   HEMOGLOBIN 13.2 12.6   HEMATOCRIT 39.6 37.5   MCV 78.3* 77.8*   MCH 26.1* 26.1*   MCHC 33.3* 33.6   RDW 46.3 45.4   PLATELETCT 227 211   MPV 10.8 10.7     Recent Labs     10/08/19  2135 10/09/19  0240   SODIUM 133* 133*   POTASSIUM 3.7 3.3*   CHLORIDE 96 96   CO2 21 21   GLUCOSE 85 101*   * 114*   CREATININE 3.01* 2.44*   CALCIUM 6.7* 8.6                   Imaging  DX-CHEST-2 VIEWS   Final Result      1.  Minimal bibasilar atelectasis or scar.   2.  No lobar pneumonia or pulmonary edema.      US-RENAL   Final Result         1.  Bilaterally atrophic kidneys with bilateral echogenic renal cortex, appearance suggests medical renal disease.   2.  Bilateral renal cysts with thin septations, recommend follow-up renal sonography in 6 months for evaluation of stability.   3.  Mild prominence of the right renal pelvis, could represent extra renal pelvis morphology or trace hydronephrosis.   4.  Cholelithiasis.           Assessment/Plan  * Hypotension- (present on admission)  Assessment & Plan  This is likely hypovolemic and associated with dehydration from n/v   Cont with  IVF  I    Acute renal failure (ARF) (HCC)- (present on admission)  Assessment & Plan  This is likely  due to severe dehydration in setting of vomiting for 7 days   Cont with IVF    Renal ultrasound  Shows atrophic kidneys- should be repeated in 6 months    Consider nephro consult if no improvement of symptoms       Severe protein-calorie malnutrition (HCC)- (present on admission)  Assessment & Plan  Dietary consult and protein supplements when able to take PO    Aspiration pneumonia (HCC)- (present on admission)  Assessment & Plan  Repeat pro emil level    procalcitonin level -->was elevated at admit    Floor Rn documents coughing with any pills and eating    Patient has PCN allergy- iv clindamycin started on 10/9    Essential hypertension- (present on admission)  Assessment & Plan  bp is low    Norvasc(home medication) stopped/held    Oropharyngeal dysphagia- (present on admission)  Assessment & Plan  Swallow eval  npo    Hyponatremia- (present on admission)  Assessment & Plan  Hypovolemic, cont with IVF and recheck labs    Nausea and vomiting- (present on admission)  Assessment & Plan  Hydrate    Prn iv zofran     Elevated troponin- (present on admission)  Assessment & Plan  This is likely demand from hypotension   Cont with cardiac monitoring and serial troponins     Sepsis (HCC)- (present on admission)  Assessment & Plan  SIRS criteria identified on my evaluation include:  Tachypnea, with respirations greater than 20 per minute and Leukocyosis, with WBC greater than 12,000  Source appears to be aspiration pneumonia    Iv abx iv clindamycin started on 10/9      Check pro emil level    Hypocalcemia- (present on admission)  Assessment & Plan  Replaced and recheck labs    History of breast cancer- (present on admission)  Assessment & Plan  Hx of s/p treatment    Hypothyroid- (present on admission)  Assessment & Plan  Resume home levothyroxine        VTE prophylaxis: scd    Check am cbc, bmp

## 2019-10-10 NOTE — ASSESSMENT & PLAN NOTE
procalcitonin level -->was elevated at admit        Patient has PCN allergy- tolerated cephalosporins--> iv rocephin and po flagyl--> po omnicef and po flagyl on 10/12

## 2019-10-11 PROBLEM — R10.13 DYSPEPSIA: Status: ACTIVE | Noted: 2019-01-01

## 2019-10-11 PROBLEM — I48.91 ATRIAL FIBRILLATION WITH RAPID VENTRICULAR RESPONSE (HCC): Status: ACTIVE | Noted: 2019-01-01

## 2019-10-11 NOTE — PROGRESS NOTES
Called by nursing for new afib RVR and hypotension.  HR 140s with SBP 80s. SpO2 77% but poor waveform.  Patient denies dizziness, CP, SOB. She complains of back pain.  EKG showed afib RVR.  CBC, CMP, troponin, lactate, ddimer ordered.  SpO2 improved to 90s, CXR no significant edema.  Given 500cc bolus with no improvement of BP.  Amiodarone 150mg given, briefly converted before returning to afib RVR.  I consulted Dr. Stock.  Additional 500cc bolus and amiodarone 150mg given and patient cardioverted. BP improved.     Will continue NS 150ml/h for dehydration  Continue amiodarone load  Echo ordered  DDimer was positive. Nursing says patient was previously on room air and hypoxic today. Ordered for heparin infusion and CTA chest to r/o PE.

## 2019-10-11 NOTE — PROGRESS NOTES
Called and updated Dr. Montoya on pt sustaining above 150 HR and conversion to afib RVR. Per Dr. Montoya diltiazem ordered for rate control. Pt BP 98/69 Dr. Montoya called and updated again before administration of Diltiazem. Per Dr. Montoya, administering 500 mL bolus to increase BP before administering Diltiazem for rate control

## 2019-10-11 NOTE — PROGRESS NOTES
Patient saturating in the 70s, low BP, Afib RVR.  Called Rapid Response.  Dr. Merino and Cardiologist to bedside.

## 2019-10-11 NOTE — PROGRESS NOTES
Attempted to page Dr. Merino regarding potassium order, however, paging service stated that she was already off.  Paged Dr. Montoya to order IV potassium because patient cannot tolerate oral potassium at this time.

## 2019-10-11 NOTE — PROGRESS NOTES
[facility] Medicine Daily Progress Note    Date of Service  10/10/2019    Chief Complaint  86 y.o. female admitted 10/8/2019 with n.v , low bp    Interval Problem Update    Cr is 1.08  Low potassium 3.4    Elevated wbc at 18    Afebrile    Floor RN has noted coughing with pills but she passed swallow eval    C/o painful swallowing        Consultants/Specialty  none    Code Status  full    Disposition  home    Review of Systems  Review of Systems   Constitutional: Negative for chills and fever.   HENT: Negative.    Eyes: Negative.    Respiratory: Negative.    Cardiovascular: Negative.    Gastrointestinal: Positive for nausea. Negative for abdominal pain and vomiting.   Genitourinary: Negative for dysuria, frequency and urgency.   Musculoskeletal: Positive for myalgias.   Skin: Negative.    Neurological: Negative.    Endo/Heme/Allergies: Negative for environmental allergies. Does not bruise/bleed easily.   Psychiatric/Behavioral: Negative.    All other systems reviewed and are negative.       Physical Exam  Temp:  [36.2 °C (97.1 °F)-36.9 °C (98.5 °F)] 36.9 °C (98.5 °F)  Pulse:  [] 111  Resp:  [16-18] 18  BP: (102-130)/(50-65) 130/65  SpO2:  [92 %-99 %] 99 %    Physical Exam   Constitutional: She is oriented to person, place, and time. She appears well-developed. No distress.   thin   HENT:   Head: Normocephalic and atraumatic.   Mouth/Throat: No oropharyngeal exudate.   Eyes: Pupils are equal, round, and reactive to light. EOM are normal. Left eye exhibits no discharge.   Neck: No JVD present. No tracheal deviation present. No thyromegaly present.   Cardiovascular: Normal rate, regular rhythm and intact distal pulses. Exam reveals no gallop and no friction rub.   No murmur heard.  Pulmonary/Chest: No respiratory distress. She has no wheezes. She has no rales.   Poor inspiration effort   Abdominal: Soft. Bowel sounds are normal. She exhibits distension (abdominal hernia unchanged). There is no tenderness. There is  no guarding.   Musculoskeletal: Normal range of motion. She exhibits no edema or deformity.   Neurological: She is alert and oriented to person, place, and time. No cranial nerve deficit. Coordination normal.   Skin: Skin is warm and dry. No rash noted. No erythema.   Psychiatric: She has a normal mood and affect.       Fluids    Intake/Output Summary (Last 24 hours) at 10/10/2019 1934  Last data filed at 10/10/2019 0715  Gross per 24 hour   Intake --   Output 300 ml   Net -300 ml       Laboratory  Recent Labs     10/08/19  2135 10/09/19  0240 10/10/19  0213   WBC 20.5* 19.1* 18.0*   RBC 5.06 4.82 4.83   HEMOGLOBIN 13.2 12.6 12.5   HEMATOCRIT 39.6 37.5 38.2   MCV 78.3* 77.8* 79.1*   MCH 26.1* 26.1* 25.9*   MCHC 33.3* 33.6 32.7*   RDW 46.3 45.4 47.3   PLATELETCT 227 211 234   MPV 10.8 10.7 10.6     Recent Labs     10/08/19  2135 10/09/19  0240 10/10/19  0213   SODIUM 133* 133* 140   POTASSIUM 3.7 3.3* 3.4*   CHLORIDE 96 96 104   CO2 21 21 23   GLUCOSE 85 101* 116*   * 114* 70*   CREATININE 3.01* 2.44* 1.08   CALCIUM 6.7* 8.6 8.4*                   Imaging  OUTSIDE IMAGES-DX CHEST   Final Result      OUTSIDE IMAGES-CT HEAD   Final Result      DX-CHEST-2 VIEWS   Final Result      1.  Minimal bibasilar atelectasis or scar.   2.  No lobar pneumonia or pulmonary edema.      US-RENAL   Final Result         1.  Bilaterally atrophic kidneys with bilateral echogenic renal cortex, appearance suggests medical renal disease.   2.  Bilateral renal cysts with thin septations, recommend follow-up renal sonography in 6 months for evaluation of stability.   3.  Mild prominence of the right renal pelvis, could represent extra renal pelvis morphology or trace hydronephrosis.   4.  Cholelithiasis.      DX-ESOPHAGUS - BARIUM SWALLOW    (Results Pending)        Assessment/Plan  * Hypotension- (present on admission)  Assessment & Plan  Resolved with ivf  I    Acute renal failure (ARF) (HCC)- (present on admission)  Assessment &  Plan  This is likely due to severe dehydration in setting of vomiting for 7 days   Cont with IVF- will decrease to 75cc/hr    Renal ultrasound  Shows atrophic kidneys- should be repeated in 6 months      Check am bmp        Odynophagia- (present on admission)  Assessment & Plan  Barium swallow    Severe protein-calorie malnutrition (HCC)- (present on admission)  Assessment & Plan  Dietary consult and protein supplements when able to take PO    Aspiration pneumonia (HCC)- (present on admission)  Assessment & Plan      procalcitonin level -->was elevated at admit        Patient has PCN allergy- tolerated cephalosporins--> iv rpcephin and po flagyl    Essential hypertension- (present on admission)  Assessment & Plan  bp is low    Norvasc(home medication) stopped/held    Oropharyngeal dysphagia- (present on admission)  Assessment & Plan  Diet as per speech    Hyponatremia- (present on admission)  Assessment & Plan  Hypovolemic, cont with IVF and recheck labs    Nausea and vomiting- (present on admission)  Assessment & Plan  Hydrate    Prn iv zofran     Elevated troponin- (present on admission)  Assessment & Plan  This is likely demand from hypotension   Cont with cardiac monitoring and serial troponins     Sepsis (HCC)- (present on admission)  Assessment & Plan  SIRS criteria identified on my evaluation include:  Tachypnea, with respirations greater than 20 per minute and Leukocyosis, with WBC greater than 12,000  Source appears to be aspiration pneumonia    Iv abx iv clindamycin started on 10/9..changed to iv rocephin and flagyl on 1/10          Hypocalcemia- (present on admission)  Assessment & Plan  Replaced and recheck labs    History of breast cancer- (present on admission)  Assessment & Plan  Hx of s/p treatment    Hypothyroid- (present on admission)  Assessment & Plan  Resume home levothyroxine        VTE prophylaxis: scd    Check am cbc, bmp

## 2019-10-11 NOTE — PROGRESS NOTES
Administered levothyroxine and metronidazole crushed in applesauce.  Patient vomited 2 minutes later and medication came back up.

## 2019-10-11 NOTE — DISCHARGE PLANNING
RN CM left letter for patient's daughter, Jenni, to give to her employer at bedside stating that she has a family member admitted to hospital.

## 2019-10-11 NOTE — PROGRESS NOTES
"Chart reviewed for AMI and HF quality measures. Neither diagnosis applicable at this time, patient is being followed by cardiology for AF c RVR.     Please place a \"Consult Nurse Navigator\" order (can be found in the HF order set) should AMI or HF become an active diagnosis.    Lashay RICH RN, [facility] ext. [phone] M-F        "

## 2019-10-11 NOTE — PROGRESS NOTES
Inpatient Anticoagulation Service Note    Date: 10/11/2019    Reason for Anticoagulation: Atrial Fibrillation   Target INR: 2.0 to 3.0  [id number] VASc Score: 4  HAS-BLED Score: 1   Hemoglobin Value: (!) 11.8  Hematocrit Value: (!) 36.5    INR from last 7 days     Date/Time INR Value    10/10/19 2355  (!) 1.18        Dose from last 7 days     Date/Time Dose (mg)    10/11/19 1500  4            Significant Interactions: Amiodarone, Antibiotics, Flagyl, Statin  Bridge Therapy: No     Reversal Agent Administered: Not Applicable    Comments:  86 y.o. female admitted 10/8/2019 with n/v, hypotension. PMH significant for HTN, hypothyroid, Dyslipidemia. Warfarin for AFib, on Amiodarone/Cardizem. DDI noted . H/H low , decreased over interval. No documented signs of overt bleeding. INR below goal.     Plan:  Warfarin 4mg po daily , INR in AM.   Education Material Provided?: No  Pharmacist suggested discharge dosing: Warfarin 3-4mg po daily , follow up with anticoagulation clinic after discharge.      Lopez Miller PharmD BCPS

## 2019-10-11 NOTE — CARE PLAN
Problem: Safety  Goal: Will remain free from injury  Outcome: PROGRESSING AS EXPECTED  Note:   Fall precautions in place. Bed in lowest position. Non-skid socks in place. Personal possessions within reach. Mobility sign on door. Bed-alarm on. Call light within reach. Pt educated regarding fall prevention and states understanding.       Problem: Infection  Goal: Will remain free from infection  Outcome: PROGRESSING AS EXPECTED  Note:   Pt educated on importance of hand hygiene to reduce risk of infection

## 2019-10-11 NOTE — CARE PLAN
Bed locked and in lowest position. Bed alarm on. Treaded socks in use. Call light and belongings within reach. Patient educated to call for assistance. Patient verbalizes understanding. Hourly rounding in place.    Skin assessment completed, Q2 turns in place. Pillows in use for positioning. Waffle cushion in use. Silicone oxygen tubing in place. Frequent linen changes to ensure patient stays dry. Hourly rounding in place.

## 2019-10-11 NOTE — PROGRESS NOTES
Received report from day shift RNCarina. Pt sitting at edge of bed and does not appear to be in any distress. Bed in lowest locked position, call light and personal belongings within reach. POC addressed, white board updated. No further questions at this time.

## 2019-10-11 NOTE — DISCHARGE PLANNING
"Care Transition Team Assessment     RN RODERICK met with patient at bedside. Patient lives in a mobile home and daughter lives with her. She uses a walker at baseline, has railings in her shower and bars in the home to assist with ambulating around her home. Per patient, she has multiple grandchildren that assist with rides when needed, along with her daughter.    Patient reports not having a current PCP in [place] because \"they all leave.\"  Patient does not have a AD and not up to filling one out this stay.  She gets monthly income from social security and a pension, unsure of how much in total. Daughter can be ride home upon discharge.     Information Source  Orientation : Oriented x 4  Information Given By: Patient  Who is responsible for making decisions for patient? : Patient    Readmission Evaluation  Is this a readmission?: No    Elopement Risk  Legal Hold: No  Ambulatory or Self Mobile in Wheelchair: Yes  Disoriented: No  Psychiatric Symptoms: None  History of Wandering: No  Elopement this Admit: No  Vocalizing Wanting to Leave: No  Displays Behaviors, Body Language Wanting to Leave: No-Not at Risk for Elopement  Elopement Risk: Not at Risk for Elopement  Wanderguard On: Unavailable    Interdisciplinary Discharge Planning  Does Admitting Nurse Feel This Could be a Complex Discharge?: No  Lives with - Patient's Self Care Capacity: Other (Comments)(siblings)  Patient or legal guardian wants to designate a caregiver (see row info): No  Support Systems: Family Member(s), Children  Housing / Facility: Mobile Home  Do You Take your Prescribed Medications Regularly: Yes  Able to Return to Previous ADL's: Yes  Prior Services: Intermittent Physical Support for ADL Per Family  Patient Expects to be Discharged to:: home  Assistance Needed: Unknown at this Time  Durable Medical Equipment: Walker    Discharge Preparedness  What is your plan after discharge?: Home with help  What are your discharge supports?: Child  Prior " Functional Level: Ambulatory, Uses Walker, Needs Assist with Activities of Daily Living  Difficulity with IADLs: Driving, Shopping, Laundry  Difficulity with IADL Comments: daughter, granddaughters assist    Functional Assesment  Prior Functional Level: Ambulatory, Uses Walker, Needs Assist with Activities of Daily Living    Finances  Financial Barriers to Discharge: No  Prescription Coverage: Yes    Vision / Hearing Impairment  Vision Impairment : Yes  Right Eye Vision: Impaired, Wears Glasses  Left Eye Vision: Impaired, Wears Glasses  Hearing Impairment : No    Advance Directive  Advance Directive?: None    Domestic Abuse  Have you ever been the victim of abuse or violence?: No  Physical Abuse or Sexual Abuse: No  Verbal Abuse or Emotional Abuse: No  Possible Abuse Reported to:: Not Applicable    Psychological Assessment  History of Psychiatric Problems: No    Discharge Risks or Barriers  Discharge risks or barriers?: No    Anticipated Discharge Information  Anticipated discharge disposition: Home  Discharge Address: [facility] [address], [place] [address]  Discharge Contact Phone Number: [phone]

## 2019-10-11 NOTE — PROGRESS NOTES
Pt seen with family    She could likely afford warfarin  Continue amio 200 daily    Future Appointments   Date Time Provider Department Center   10/24/2019  3:45 PM MICHAEL Carr. [facility] None     Cardiology will sign off    Hopefully can arrange followup in [facility]    It is my pleasure to participate in the care of Ms. Kerr.  Please do not hesitate to contact me with questions or concerns.    Arun Painter MD PhD [facility]  Cardiologist [facility]    Please note that this dictation was created using voice recognition software. I have worked with consultants from the vendor as well as technical experts from [facility] to optimize the interface. I have made every reasonable attempt to correct obvious errors, but I expect that there are errors of grammar and possibly content I did not discover before finalizing the note.

## 2019-10-11 NOTE — PROGRESS NOTES
Assumed care of patient, bedside report received from ARMANDO Campbell. Updated on POC, call light within reach and fall precautions in place. Bed locked and in lowest position. Patient instructed to call for assistance before getting out of bed. All questions answered, no other needs at this time.

## 2019-10-11 NOTE — CARE PLAN
Problem: Nutritional:  Goal: Achieve adequate nutritional intake  Description  Patient will consume 50% of meals   Outcome: PROGRESSING SLOWER THAN EXPECTED     Unable to speak with pt at bedside; 2 unopened Boost observed. 25-50% PO intake dinner 10/10 per RN documentation. RD will continue to monitor.

## 2019-10-11 NOTE — CONSULTS
Cardiology Consultation Note      Date of service: 10/11/2019      Requesting Physician: Dr. Montoya      Reason for consultation: Atrial fibrillation with rapid ventricular response and hypotension      History of present illness    The patient 86 years old female who was with hypotension protected nausea vomiting and sepsis.  She has history of hypertension and hypothyroidism but no known previous clinic history.  Eearlier tonight she suddenly went into atrial fibrillation with rapid ventricular response.  Blood pressure has decreased into the 80s systolic.  She denies any chest pain or shortness of breath or dizziness.  She has been given 1 dose of 150 mg of IV amiodarone loading but remained in atrial fibrillation with rapid rate.  She also has been given 500 ml of normal saline bolus and current blood pressure in the low 90s systolic.    She has been afebrile and there is no evidence of bleeding.  There is no recent thyroid function test.  There is also no recent echocardiography or major cardiac testing.    Allergies   Allergen Reactions   • Pcn [Penicillins] Hives     Tolerated Ceftriaxone 10/8/19       @HOMEMEDS      Current Facility-Administered Medications:   •  AMIODARONE HCL IN DEXTROSE 150-4.21 MG/100ML-% IV SOLN, , , ,   •  NS infusion, , Intravenous, Continuous, Vandana Stock M.D., Last Rate: 150 mL/hr at 10/11/19 0100  •  DILTIAZem (CARDIZEM) injection 10 mg, 10 mg, Intravenous, Q6HRS PRN, Kamlesh Merino M.D.  •  MD Alert...HEPARIN WEIGHT BASED PROTOCOL Pharmacist to implement, , Other, PRN, Kamlesh Merino M.D.  •  [COMPLETED] heparin injection 4,000 Units, 4,000 Units, Intravenous, Once, 4,000 Units at 10/11/19 0122 **AND** heparin injection 2,200 Units, 2,200 Units, Intravenous, PRN **AND** heparin infusion 25,000 units in 500 mL 0.45% NACL, , Intravenous, Continuous, Last Rate: 18 mL/hr at 10/11/19 0130, 900 Units/hr at 10/11/19 0130 **AND** Protocol 440 Heparin Weight Based DO NOT GIVE ANY  HEPARIN BOLUS TO STROKE PATIENT, , , CONTINUOUS **AND** Protocol 440 Heparin Weight Based Discontinue Enoxaparin (Lovenox), Dabigatran (Pradaxa), Rivaroxaban (Xarelto), Apixaban (Eliquis), Edoxaban (Savaysa, Lixiana), Fondaparinux (Arixtra) and Argatroban prior to heparin administration, , , CONTINUOUS **AND** Protocol 440 Heparin Weight Based Draw baseline aPTT, PT, and platelet count if not already done, , , CONTINUOUS **AND** Protocol 440 Heparin Weight Based Draw aPTT 6 hours after beginning infusion. , , , CONTINUOUS **AND** Protocol 440 Heparin Weight Based Draw Platelet count every three days. Contact MD if platelet is 50% lower than baseline count., , , CONTINUOUS **AND** Protocol 440 Heparin Weight Based Record Patient Data, , , CONTINUOUS **AND** Protocol 440 Heparin Weight Based INSTRUCTIONS, , , CONTINUOUS **AND** Protocol 440 Heparin Weight Based Review aPTT results 6 hours after infusion is begun as detailed, , , CONTINUOUS **AND** Protocol 440 Heparin Weight Based Adjust heparin to maintain aPTT between 55-96 sec, , , CONTINUOUS **AND** Protocol 440 Heparin Weight Based Order aPTT 6 hours after any rate change or hold until aPTT is therapeutic (55-96 seconds), , , CONTINUOUS **AND** Protocol 440 Heparin Weight Based Documentation and verification, , , CONTINUOUS, Kamlesh Merion M.D.  •  potassium chloride SA (Kdur) tablet 40 mEq, 40 mEq, Oral, Once, Kamlesh Merino M.D., Stopped at 10/11/19 0130  •  cefTRIAXone (ROCEPHIN) 1 g in  mL IVPB, 1 g, Intravenous, Q24HRS, Adrian Kimble M.D., Stopped at 10/10/19 1319  •  metroNIDAZOLE (FLAGYL) tablet 500 mg, 500 mg, Oral, Q8HRS, Adrian Kimble M.D., 500 mg at 10/10/19 2154  •  prochlorperazine (COMPAZINE) injection 10 mg, 10 mg, Intravenous, Q6HRS PRN, Adrian Kimble M.D., 10 mg at 10/10/19 2121  •  D5W infusion, , Intravenous, Continuous, Kamlesh Merino M.D.  •  amiodarone (CORDARONE) 450 mg in D5W 250 mL Infusion, 0.5-1 mg/min, Intravenous, Continuous,  Kamlesh Merino M.D., Last Rate: 33 mL/hr at 10/11/19 0010, 1 mg/min at 10/11/19 0010  •  acetaminophen (TYLENOL) tablet 650 mg, 650 mg, Oral, Q6HRS PRN, Adrian Kimble M.D., 650 mg at 10/10/19 0240  •  lactated ringers infusion (BOLUS), 1,000 mL, Intravenous, Once PRN, Yasmine Gaona M.D.  •  senna-docusate (PERICOLACE or SENOKOT S) 8.6-50 MG per tablet 2 Tab, 2 Tab, Oral, BID, Stopped at 10/10/19 0600 **AND** polyethylene glycol/lytes (MIRALAX) PACKET 1 Packet, 1 Packet, Oral, QDAY PRN **AND** magnesium hydroxide (MILK OF MAGNESIA) suspension 30 mL, 30 mL, Oral, QDAY PRN **AND** bisacodyl (DULCOLAX) suppository 10 mg, 10 mg, Rectal, QDAY PRN, Yasmine Gaona M.D.  •  ondansetron (ZOFRAN) syringe/vial injection 4 mg, 4 mg, Intravenous, Q4HRS PRN, Yasmine Gaona M.D., 4 mg at 10/10/19 1254  •  ondansetron (ZOFRAN ODT) dispertab 4 mg, 4 mg, Oral, Q4HRS PRN, Yasmine Gaona M.D.  •  levothyroxine (SYNTHROID) tablet 100 mcg, 100 mcg, Oral, DAILY, Yasmine Gaona M.D., Stopped at 10/10/19 0600  •  lovastatin (MEVACOR) tablet 20 mg, 20 mg, Oral, Nightly, Yasmine Gaona M.D., 20 mg at 10/10/19 5254    Past Medical History:   Diagnosis Date   • Cancer (HCC) 2009    right breast   • CATARACT     both eyes   • Dental disorder     dentures uppers   • Hypertension    • Sepsis (HCC)    • Unspecified disorder of thyroid     hypothyroid       Past Surgical History:   Procedure Laterality Date   • BONE MARROW ASPIRATION  1/4/2012    Performed by NILSA HERNANDEZ at [facility]   • BONE MARROW BIOPSY, NDL/TROCAR  1/4/2012    Performed by NILSA HERNANDEZ at ENDOSCOPY [facility] ORS   • AORTOBIFEM BYPASS  12/27/2011    Performed by RHETT MILLARD at SURGERY [facility]S   • RECOVERY  12/2/2011    Performed by SURGERY, IR-RECOVERY at SURGERY SAME DAY [facility] ORS   • CATARACT EXTRACTION WITH IOL  2010    jose daniel eyes   • MASTECTOMY  10/6/2009    Performed by RHETT MILLARD at SURGERY [facility]  [place] [facility]S   • NODE BIOPSY SENTINEL  10/6/2009    [facility] [place] [facility]   • OTHER      brain anuerysm   • OTHER      2 polyps and cyst in breast but were not cancer 1970   • OTHER NEUROLOGICAL SURG      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 No pertinent family history.    Social History     Socioeconomic History   • Marital status:      Spouse name: Not on file   • Number of children: Not on file   • Years of education: Not on file   • Highest education level: Not on file   Occupational History   • Not on file   Social Needs   • Financial resource strain: Not on file   • Food insecurity:     Worry: Not on file     Inability: Not on file   • Transportation needs:     Medical: Not on file     Non-medical: Not on file   Tobacco Use   • Smoking status: Former Smoker     Packs/day: 0.50     Years: 60.00     Pack years: 30.00     Types: Cigarettes     Last attempt to quit: 2010     Years since quittin.1   • Smokeless tobacco: Never Used   Substance and Sexual Activity   • Alcohol use: No     Comment: rarely   • Drug use: No   • Sexual activity: Not on file   Lifestyle   • Physical activity:     Days per week: Not on file     Minutes per session: Not on file   • Stress: Not on file   Relationships   • Social connections:     Talks on phone: Not on file     Gets together: Not on file     Attends Taoist service: Not on file     Active member of club or organization: Not on file     Attends meetings of clubs or organizations: Not on file     Relationship status: Not on file   • Intimate partner violence:     Fear of current or ex partner: Not on file     Emotionally abused: Not on file     Physically abused: Not on file     Forced sexual activity: Not on file   Other Topics Concern   • Not on file   Social History Narrative   • Not on file         Review of systems;    General: No fever, chills, + weakness or fatigue  HENT: No discharge, no  toothache or sore throat  Eyes: No blurred vision or double vision  Heart: No palpitation, no PND or orthopnea, no leg swelling  Lung: No productive cough, no hemoptysis  Abdomen: No abdominal pain, +nausea vomiting, no blood in stool  : No dysuria, no frequency or hematuria  Musculoskeletal: + back pain, some joint pain  Hematology: No easy bruising  Skin: No rash or itching  Neurological: No headache, no new focal weakness or numbness  Psychological: Denies depression, anxiety or insomnia  All other review of systems are negative    Vitals:    10/11/19 0021 10/11/19 0031 10/11/19 0035 10/11/19 0110   BP: (!) 86/54 102/64 106/64 106/62   Pulse:  (!) 148 (!) 110 (!) 104   Resp:  20 20 18   Temp:    36.3 °C (97.4 °F)   TempSrc:    Temporal   SpO2:  95% 97% 96%   Weight:       Height:         GENERAL not in acute distress, not dyspnic at rest, thin  Head atraumatic, normocephalic  Eyes EOMI  ENT neck supple, no JVD, no carotid bruits or thyromegaly  Lung good expansion, distant sound, no rales or wheezing  Heart irregularly irregular, tachycardic, no murmur, gallop or rub  Abd soft, moderately distended, midline scar with ventral hernia, no tenderness or bruits  Ext no edema  Skin no ecchymosis or petechiae  Musculoskeletal no deformity  Neuro grossly intact  Psych normal mood, normal affect    EKG from October 8 by my review showed sinus rhythm with diffuse ST-T changes    EKG from October 10 at midnight showed atrial fibrillation with rapid ventricular rate in (168 bpm)    CMP October 10; potassium 3.4, BUN 70 creatinine 1  Admission BUN/creatinine was 114 and 2.4 respectively  WBC on October 8 was 20,000 hemoglobin 13    Assessment and plans    1.  Atrial fibrillation with rapid ventricular response, new onset, with hypotension  We will try second bolus of IV amiodarone.  Some of the hypotension is likely from prerenal azotemia.  We will also give another bolus of IV fluid.  If there is no immediate improvement  will proceed with synchronized cardioversion.  If blood pressure remains low who use digoxin for rate control, otherwise we will use beta-blocker.  With her nausea and vomiting probably need to use parenteral medication.  Of atrial fibrillation probably precipitated by acute stress of sepsis.  May not need long-term antiarrhythmic drug or long-term anticoagulation if it quickly resolved and no recurrence during this  hospitalization.  Will check thyroid function and echocardiography.    2.  Hypotension multifactorial  As mentioned mentioned above, from #1, sepsis and she likely still has significant hypovolemia.    3.  Leukocytosis/sepsis  Per primary team    4.  History of hypertension  To withhold antihypertensive medication for now    5.  History of thyroid dysfunction  We will check thyroid function test as mentioned above    Will follow the patient along with you.  Thank you consultation.    Please note that this dictation was created using voice recognition software. I have worked with consultants from the vendor as well as technical experts from [employer] [employer] to optimize the interface. I have made every reasonable attempt to correct obvious errors, but I expect that there are errors of grammar and possibly content I did not discover before finalizing the note

## 2019-10-11 NOTE — THERAPY
"Occupational Therapy Treatment completed with focus on ADLs, ADL transfers and patient education.  Plan of Care: Will benefit from Occupational Therapy 3 times per week  Discharge Recommendations:  Equipment Will Continue to Assess for Equipment Needs. Post-acute therapy Recommend post-acute placement for additional occupational therapy services prior to discharge home. Patient can tolerate post-acute therapies at a 5x/week frequency.    Patient seen for OT treat focused on seated / standing ADLs in bathroom necessitating Mod A seated bathing and Min A mobility with FWW, uncontrolled descent, increased time and FWW cues. Patient would benefit from skilled OT in this setting followed by post acute placement to max gains prior to home with services. Patient limited by fatigue, desaturation with activity on 3 L o2, diminished appetite, and abdominal pain.     See \"Rehab Therapy-Acute\" Patient Summary Report for complete documentation.   "

## 2019-10-12 PROBLEM — E87.6 HYPOKALEMIA: Status: ACTIVE | Noted: 2019-01-01

## 2019-10-12 NOTE — PROGRESS NOTES
[facility] Medicine Daily Progress Note    Date of Service  10/11/2019    Chief Complaint  86 y.o. female admitted 10/8/2019 with n.v , low bp    Interval Problem Update    Cr is 0.83    Elevated wbc at 15    Afebrile    Floor RN states patient having trouble with  pills     Barium swallow unremarkable    Went into afib with rvr last night  Prior hx of afib     Cards md consulted    Case d/w dr dan of cards    Pt c/o dyspepsia still    Pt started on amiodarone      Consultants/Specialty  none    Code Status  full    Disposition  home    Review of Systems  Review of Systems   Constitutional: Positive for malaise/fatigue. Negative for chills and fever.   HENT: Negative.    Eyes: Negative.    Respiratory: Negative.    Cardiovascular: Negative.    Gastrointestinal: Positive for heartburn and nausea. Negative for abdominal pain and vomiting.   Genitourinary: Negative for dysuria, frequency and urgency.   Musculoskeletal: Positive for myalgias.   Skin: Negative.    Neurological: Positive for weakness.   Endo/Heme/Allergies: Negative for environmental allergies. Does not bruise/bleed easily.   Psychiatric/Behavioral: Negative.    All other systems reviewed and are negative.       Physical Exam  Temp:  [36.2 °C (97.1 °F)-37.7 °C (99.8 °F)] 36.2 °C (97.2 °F)  Pulse:  [] 70  Resp:  [17-22] 18  BP: ()/(54-77) 119/77  SpO2:  [90 %-100 %] 90 %    Physical Exam   Constitutional: She is oriented to person, place, and time. She appears well-developed. No distress.   thin   HENT:   Head: Normocephalic and atraumatic.   Mouth/Throat: No oropharyngeal exudate.   Eyes: Pupils are equal, round, and reactive to light. EOM are normal. Left eye exhibits no discharge.   Neck: No JVD present. No tracheal deviation present. No thyromegaly present.   Cardiovascular: Normal rate, regular rhythm and intact distal pulses. Exam reveals no gallop and no friction rub.   No murmur heard.  Pulmonary/Chest: No respiratory distress. She has  no wheezes. She has no rales.   Poor inspiration effort   Abdominal: Soft. Bowel sounds are normal. She exhibits distension (mid abdominal hernia). There is no tenderness. There is no guarding.   Musculoskeletal: Normal range of motion. She exhibits no edema or deformity.   Neurological: She is alert and oriented to person, place, and time. No cranial nerve deficit. Coordination normal.   Skin: Skin is warm and dry. No rash noted. No erythema.   Psychiatric: She has a normal mood and affect.       Fluids    Intake/Output Summary (Last 24 hours) at 10/11/2019 1927  Last data filed at 10/11/2019 0130  Gross per 24 hour   Intake --   Output 375 ml   Net -375 ml       Laboratory  Recent Labs     10/09/19  0240 10/10/19  0213 10/10/19  2355   WBC 19.1* 18.0* 15.3*   RBC 4.82 4.83 4.46   HEMOGLOBIN 12.6 12.5 11.8*   HEMATOCRIT 37.5 38.2 36.5*   MCV 77.8* 79.1* 81.8   MCH 26.1* 25.9* 26.5*   MCHC 33.6 32.7* 32.3*   RDW 45.4 47.3 49.9   PLATELETCT 211 234 186   MPV 10.7 10.6 10.4     Recent Labs     10/09/19  0240 10/10/19  0213 10/10/19  2355   SODIUM 133* 140 144   POTASSIUM 3.3* 3.4* 3.2*   CHLORIDE 96 104 110   CO2 21 23 21   GLUCOSE 101* 116* 123*   * 70* 47*   CREATININE 2.44* 1.08 0.83   CALCIUM 8.6 8.4* 8.2*     Recent Labs     10/10/19  2355   APTT 31.5   INR 1.18*               Imaging  EC-ECHOCARDIOGRAM COMPLETE W/O CONT   Final Result      DX-ESOPHAGUS - BARIUM SWALLOW   Final Result      1.  Study limitations as above.   2.  No definite esophageal mass or stricture.   3.  No significant hiatal hernia.      CT-CTA CHEST PULMONARY ARTERY W/ RECONS   Final Result         1.  No large central pulmonary embolus is appreciated, evaluation of the subsegmental branches is essentially nondiagnostic due to motion artifacts. Additional imaging would be required for definitive exclusion of small distal pulmonary emboli.   2.  Small bilateral pleural effusions. Linear densities of the bilateral lung bases favor  changes of atelectasis.   3.  Fluid-filled distention of colon and stomach. Consider diffuse ileus or obstructive changes as clinically appropriate.   4.  Fluid-filled distention of the esophagus, appearance suggests gastroesophageal reflux or gastroesophageal junction obstruction. Could be further evaluated with an esophagoscopy.   5.  3.7 cm descending thoracic aortic aneurysm at the diaphragmatic hiatus.   6.  Cholelithiasis      DX-CHEST-PORTABLE (1 VIEW)   Final Result         1.  Residual pulmonary parenchymal prominence, consider interstitial edema and/or infiltrate. Can correspond with chronic underlying lung disease   2.  Atherosclerosis      OUTSIDE IMAGES-DX CHEST   Final Result      OUTSIDE IMAGES-CT HEAD   Final Result      DX-CHEST-2 VIEWS   Final Result      1.  Minimal bibasilar atelectasis or scar.   2.  No lobar pneumonia or pulmonary edema.      US-RENAL   Final Result         1.  Bilaterally atrophic kidneys with bilateral echogenic renal cortex, appearance suggests medical renal disease.   2.  Bilateral renal cysts with thin septations, recommend follow-up renal sonography in 6 months for evaluation of stability.   3.  Mild prominence of the right renal pelvis, could represent extra renal pelvis morphology or trace hydronephrosis.   4.  Cholelithiasis.           Assessment/Plan  * Hypotension- (present on admission)  Assessment & Plan  Resolved with ivf  I    Atrial fibrillation with rapid ventricular response (HCC)  Assessment & Plan  Po amiodarone    Po coumadin  started    Acute renal failure (ARF) (HCC)- (present on admission)  Assessment & Plan  This is likely due to severe dehydration in setting of vomiting for 7 days     Resolved    will decrease ivf to 50c/hr    Renal ultrasound  Shows atrophic kidneys- should be repeated in 6 months      Check am bmp        Dyspepsia- (present on admission)  Assessment & Plan  Po carafate and po pepcid started on 10/11    Odynophagia- (present on  admission)  Assessment & Plan  Barium swallow    Severe protein-calorie malnutrition (HCC)- (present on admission)  Assessment & Plan  Dietary consult and protein supplements when able to take PO    Aspiration pneumonia (HCC)- (present on admission)  Assessment & Plan      procalcitonin level -->was elevated at admit        Patient has PCN allergy- tolerated cephalosporins--> iv rpcephin and po flagyl    Essential hypertension- (present on admission)  Assessment & Plan  bp is low    Norvasc(home medication) stopped/held    Oropharyngeal dysphagia- (present on admission)  Assessment & Plan  Diet as per speech    Hyponatremia- (present on admission)  Assessment & Plan  Hypovolemic, cont with IVF and recheck labs    Nausea and vomiting- (present on admission)  Assessment & Plan  Hydrate    Prn iv zofran     Elevated troponin- (present on admission)  Assessment & Plan  This is likely demand from hypotension   Cont with cardiac monitoring and serial troponins     Sepsis (HCC)- (present on admission)  Assessment & Plan  SIRS criteria identified on my evaluation include:  Tachypnea, with respirations greater than 20 per minute and Leukocyosis, with WBC greater than 12,000  Source appears to be aspiration pneumonia    Iv abx iv clindamycin started on 10/9..changed to iv rocephin and flagyl on 1/10          Hypocalcemia- (present on admission)  Assessment & Plan  Replaced and recheck labs    History of breast cancer- (present on admission)  Assessment & Plan  Hx of s/p treatment    Hypothyroid- (present on admission)  Assessment & Plan  Resume home levothyroxine        VTE prophylaxis: scd    Check am cbc, bmp

## 2019-10-12 NOTE — CARE PLAN
Problem: Communication  Goal: The ability to communicate needs accurately and effectively will improve  Outcome: PROGRESSING AS EXPECTED  Note:   Communication board updated. All pt questions answered. No further questions at this time. Pt encouraged to voice feelings and ask questions as they arise.      Problem: Knowledge Deficit  Goal: Knowledge of disease process/condition, treatment plan, diagnostic tests, and medications will improve  Outcome: PROGRESSING AS EXPECTED  Note:   Discussed POC and medications with patient, patient verbalized understanding.

## 2019-10-12 NOTE — PROGRESS NOTES
Patient is in 's sustaining.  BP of 81/48. Stat EKG obtained. Dr. Montoya paged for orders. Orders received to bolus patient 250 ml and give PO amiodarone and monitor.

## 2019-10-12 NOTE — PROGRESS NOTES
Inpatient Anticoagulation Service Note    Date: 10/12/2019    Reason for Anticoagulation: Atrial Fibrillation   Target INR: 2.0 to 3.0  [id number] VASc Score: 4  HAS-BLED Score: 1   Hemoglobin Value: 12  Hematocrit Value: 38.9    INR from last 7 days     Date/Time INR Value    10/12/19 1021  (!) 1.33    10/10/19 2355  (!) 1.18        Dose from last 7 days     Date/Time Dose (mg)    10/12/19 1400  4    10/11/19 1500  4        Significant Interactions: Amiodarone, Antibiotics, Flagyl, Statin  Bridge Therapy: No     Reversal Agent Administered: Not Applicable    Comments:  86 y.o. female admitted 10/8/2019 with n/v, hypotension. PMH significant for HTN, hypothyroid, Dyslipidemia. Warfarin for AFib, on Amiodarone/Cardizem. DDI noted . H/H wnl , increased over interval. No documented signs of overt bleeding. INR below goal, increased over interval.      Plan:  Warfarin 4mg po daily , INR in AM.   Education Material Provided?: No  Pharmacist suggested discharge dosing: Warfarin 3-4mg po daily , follow up with anticoagulation clinic after discharge.      Lopez MazariegosD BCPS

## 2019-10-13 PROBLEM — A41.9 SEPSIS (HCC): Status: RESOLVED | Noted: 2019-01-01 | Resolved: 2019-01-01

## 2019-10-13 PROBLEM — E83.51 HYPOCALCEMIA: Status: RESOLVED | Noted: 2019-01-01 | Resolved: 2019-01-01

## 2019-10-13 PROBLEM — I48.91 ATRIAL FIBRILLATION WITH RAPID VENTRICULAR RESPONSE (HCC): Status: RESOLVED | Noted: 2019-01-01 | Resolved: 2019-01-01

## 2019-10-13 PROBLEM — R13.12 OROPHARYNGEAL DYSPHAGIA: Status: RESOLVED | Noted: 2019-01-01 | Resolved: 2019-01-01

## 2019-10-13 PROBLEM — I95.9 HYPOTENSION: Status: RESOLVED | Noted: 2019-01-01 | Resolved: 2019-01-01

## 2019-10-13 PROBLEM — J69.0 ASPIRATION PNEUMONIA (HCC): Status: RESOLVED | Noted: 2019-01-01 | Resolved: 2019-01-01

## 2019-10-13 PROBLEM — E87.1 HYPONATREMIA: Status: RESOLVED | Noted: 2019-01-01 | Resolved: 2019-01-01

## 2019-10-13 PROBLEM — N17.9 ACUTE RENAL FAILURE (ARF) (HCC): Status: RESOLVED | Noted: 2019-01-01 | Resolved: 2019-01-01

## 2019-10-13 PROBLEM — R13.10 ODYNOPHAGIA: Status: RESOLVED | Noted: 2019-01-01 | Resolved: 2019-01-01

## 2019-10-13 PROBLEM — E87.6 HYPOKALEMIA: Status: RESOLVED | Noted: 2019-01-01 | Resolved: 2019-01-01

## 2019-10-13 PROBLEM — R79.89 ELEVATED TROPONIN: Status: RESOLVED | Noted: 2019-01-01 | Resolved: 2019-01-01

## 2019-10-13 PROBLEM — R11.2 NAUSEA AND VOMITING: Status: RESOLVED | Noted: 2019-01-01 | Resolved: 2019-01-01

## 2019-10-13 PROBLEM — I10 ESSENTIAL HYPERTENSION: Status: RESOLVED | Noted: 2019-01-01 | Resolved: 2019-01-01

## 2019-10-13 NOTE — DISCHARGE PLANNING
Anticipated Discharge Disposition:   · Home    Action:   · LSW to bedside to provide pt with 2nd IMM as pt is anticipated to discharge home.   · BSN updated LSW that pt may need to be discharged on oxygen, currently no orders/F2F and pending oxygen evaluation.   · LSW met with pt and her family who is here from [facility] to provide transportation to discussed IMM and discharge process. Family and pt reported she is not currently on oxygen at home but has been in the past. They reported past service with [facility] and requested referral be sent to [facility] should pt need oxygen upon discharge.  · LSW reviewed possible difficulties with obtaining oxygen due to the weekend and many companies being closed. Family voiced understanding.   · LSW completed CHOICE for oxygen per family and pt's verbal request for [facility]. CHOICE faxed to [facility] ext [phone]    Barriers to Discharge:   · Pending oxygen evaluation   · If oxygen required upon discharge, pending oxygen acceptance/deliver from DME company of choice, [email or web address].     Plan:   · Care coordination will continue to follow up and provide assistance as needed with discharge plans/barriers.

## 2019-10-13 NOTE — PROGRESS NOTES
Bedside report received from night RN, pt care assumed. Pt is A&Ox4, pain 0/10. Patient sitting at edge of bed during this time, no complaints of nausea. Updated on POC, questions answered. Bed in lowest, locked position, treaded socks on, call light and belongings within reach.

## 2019-10-13 NOTE — PROGRESS NOTES
Pt was given Amiodarone, Synthroid, Flagyl, and Senna-Docusate at morning med pass. Pt proceeded to vomit right after administering these medications. Dr. Montoya was notified and specified to write a note on the exact medications and to let the primary hospital ist know.

## 2019-10-13 NOTE — PROGRESS NOTES
[facility] Medicine Daily Progress Note    Date of Service  10/12/2019    Chief Complaint  86 y.o. female admitted 10/8/2019 with n.v , low bp    Interval Problem Update    Cr is 0.71    Elevated wbc at 17    Afebrile    patient states she is swallowing easier      On po amiodarone    Low potassium 3.3    Lost IV access today      Consultants/Specialty  none    Code Status  full    Disposition  home    Review of Systems  Review of Systems   Constitutional: Positive for malaise/fatigue. Negative for chills and fever.   HENT: Negative.    Eyes: Negative.    Respiratory: Negative.    Cardiovascular: Negative.    Gastrointestinal: Negative for abdominal pain, heartburn, nausea and vomiting.   Genitourinary: Negative for dysuria, frequency and urgency.   Musculoskeletal: Negative for myalgias.   Skin: Negative.    Neurological: Positive for weakness.   Endo/Heme/Allergies: Negative for environmental allergies. Does not bruise/bleed easily.   Psychiatric/Behavioral: Negative.    All other systems reviewed and are negative.       Physical Exam  Temp:  [36.1 °C (97 °F)-36.9 °C (98.5 °F)] 36.1 °C (97 °F)  Pulse:  [] 51  Resp:  [17-18] 17  BP: ()/(48-77) 92/56  SpO2:  [90 %-99 %] 94 %    Physical Exam   Constitutional: She is oriented to person, place, and time. She appears well-developed. No distress.   thin   HENT:   Head: Normocephalic and atraumatic.   Mouth/Throat: No oropharyngeal exudate.   Eyes: Pupils are equal, round, and reactive to light. EOM are normal. Left eye exhibits no discharge.   Neck: No JVD present. No tracheal deviation present. No thyromegaly present.   Cardiovascular: Normal rate, regular rhythm and intact distal pulses. Exam reveals no gallop and no friction rub.   No murmur heard.  Pulmonary/Chest: No respiratory distress. She has no wheezes. She has no rales.   Poor inspiration effort   Abdominal: Soft. Bowel sounds are normal. She exhibits distension ( mid abdominal hernia). There is no  tenderness. There is no guarding.   Musculoskeletal: Normal range of motion. She exhibits no edema or deformity.   Neurological: She is alert and oriented to person, place, and time. No cranial nerve deficit. Coordination normal.   Skin: Skin is warm and dry. No rash noted. No erythema.   Psychiatric: She has a normal mood and affect.       Fluids  No intake or output data in the 24 hours ending 10/12/19 2022    Laboratory  Recent Labs     10/10/19  0213 10/10/19  2355 10/12/19  1021   WBC 18.0* 15.3* 17.8*   RBC 4.83 4.46 4.58   HEMOGLOBIN 12.5 11.8* 12.0   HEMATOCRIT 38.2 36.5* 38.9   MCV 79.1* 81.8 84.9   MCH 25.9* 26.5* 26.2*   MCHC 32.7* 32.3* 30.8*   RDW 47.3 49.9 53.7*   PLATELETCT 234 186 164   MPV 10.6 10.4 10.1     Recent Labs     10/10/19  0213 10/10/19  2355 10/12/19  1021   SODIUM 140 144 143   POTASSIUM 3.4* 3.2* 3.3*   CHLORIDE 104 110 112   CO2 23 21 19*   GLUCOSE 116* 123* 98   BUN 70* 47* 28*   CREATININE 1.08 0.83 0.71   CALCIUM 8.4* 8.2* 7.9*     Recent Labs     10/10/19  2355 10/12/19  1021   APTT 31.5  --    INR 1.18* 1.33*               Imaging  EC-ECHOCARDIOGRAM COMPLETE W/O CONT   Final Result      DX-ESOPHAGUS - BARIUM SWALLOW   Final Result      1.  Study limitations as above.   2.  No definite esophageal mass or stricture.   3.  No significant hiatal hernia.      CT-CTA CHEST PULMONARY ARTERY W/ RECONS   Final Result         1.  No large central pulmonary embolus is appreciated, evaluation of the subsegmental branches is essentially nondiagnostic due to motion artifacts. Additional imaging would be required for definitive exclusion of small distal pulmonary emboli.   2.  Small bilateral pleural effusions. Linear densities of the bilateral lung bases favor changes of atelectasis.   3.  Fluid-filled distention of colon and stomach. Consider diffuse ileus or obstructive changes as clinically appropriate.   4.  Fluid-filled distention of the esophagus, appearance suggests gastroesophageal  reflux or gastroesophageal junction obstruction. Could be further evaluated with an esophagoscopy.   5.  3.7 cm descending thoracic aortic aneurysm at the diaphragmatic hiatus.   6.  Cholelithiasis      DX-CHEST-PORTABLE (1 VIEW)   Final Result         1.  Residual pulmonary parenchymal prominence, consider interstitial edema and/or infiltrate. Can correspond with chronic underlying lung disease   2.  Atherosclerosis      OUTSIDE IMAGES-DX CHEST   Final Result      OUTSIDE IMAGES-CT HEAD   Final Result      DX-CHEST-2 VIEWS   Final Result      1.  Minimal bibasilar atelectasis or scar.   2.  No lobar pneumonia or pulmonary edema.      US-RENAL   Final Result         1.  Bilaterally atrophic kidneys with bilateral echogenic renal cortex, appearance suggests medical renal disease.   2.  Bilateral renal cysts with thin septations, recommend follow-up renal sonography in 6 months for evaluation of stability.   3.  Mild prominence of the right renal pelvis, could represent extra renal pelvis morphology or trace hydronephrosis.   4.  Cholelithiasis.           Assessment/Plan  * Hypotension- (present on admission)  Assessment & Plan  Resolved with ivf  I    Atrial fibrillation with rapid ventricular response (HCC)  Assessment & Plan  Po amiodarone    Po coumadin  started    Acute renal failure (ARF) (HCC)- (present on admission)  Assessment & Plan  This is likely due to severe dehydration in setting of vomiting for 7 days     Resolved   Stop ivf    Renal ultrasound  Shows atrophic kidneys- should be repeated in 6 months      Check am bmp        Hypokalemia  Assessment & Plan  Replace po    checkl am bmp    Dyspepsia- (present on admission)  Assessment & Plan  Po carafate and po pepcid started on 10/11    Odynophagia- (present on admission)  Assessment & Plan  Barium swallow shows no acute findings    Po carafate and po pepcid    Severe protein-calorie malnutrition (HCC)- (present on admission)  Assessment & Plan  Protein  supplements    Aspiration pneumonia (HCC)- (present on admission)  Assessment & Plan      procalcitonin level -->was elevated at admit        Patient has PCN allergy- tolerated cephalosporins--> iv rocephin and po flagyl--> po omnicef and po flagyl on 10/12    Essential hypertension- (present on admission)  Assessment & Plan  bp is low    Norvasc(home medication) stopped/held    Oropharyngeal dysphagia- (present on admission)  Assessment & Plan  Diet as per speech    Hyponatremia- (present on admission)  Assessment & Plan  Resolved with ivf    Nausea and vomiting- (present on admission)  Assessment & Plan  resolved    Prn iv zofran     Elevated troponin- (present on admission)  Assessment & Plan  This is likely demand from hypotension       Sepsis (HCC)- (present on admission)  Assessment & Plan  SIRS criteria identified on my evaluation include:  Tachypnea, with respirations greater than 20 per minute and Leukocyosis, with WBC greater than 12,000  Source appears to be aspiration pneumonia    Iv abx iv clindamycin started on 10/9..changed to iv rocephin and flagyl on 1/10        Lost iv access on 10/12--> change to po omnicef and po flagyl          Hypocalcemia- (present on admission)  Assessment & Plan  Replaced     History of breast cancer- (present on admission)  Assessment & Plan  Hx of s/p treatment    Hypothyroid- (present on admission)  Assessment & Plan  Resume home levothyroxine        VTE prophylaxis: scd    Check am cbc, bmp

## 2019-10-13 NOTE — DISCHARGE INSTRUCTIONS
F/u pcp asap    Take one MVI daily    Discharge Instructions    Discharged to home by car with relative. Discharged via wheelchair, hospital escort: Refused.  Special equipment needed: Not Applicable    Be sure to schedule a follow-up appointment with your primary care doctor or any specialists as instructed.     Atrial Fibrillation  Introduction  Atrial fibrillation is a type of heartbeat that is irregular or fast (rapid). If you have this condition, your heart keeps quivering in a weird (chaotic) way. This condition can make it so your heart cannot pump blood normally. Having this condition gives a person more risk for stroke, heart failure, and other heart problems. There are different types of atrial fibrillation. Talk with your doctor to learn about the type that you have.  Follow these instructions at home:  · Take over-the-counter and prescription medicines only as told by your doctor.  · If your doctor prescribed a blood-thinning medicine, take it exactly as told. Taking too much of it can cause bleeding. If you do not take enough of it, you will not have the protection that you need against stroke and other problems.  · Do not use any tobacco products. These include cigarettes, chewing tobacco, and e-cigarettes. If you need help quitting, ask your doctor.  · If you have apnea (obstructive sleep apnea), manage it as told by your doctor.  · Do not drink alcohol.  · Do not drink beverages that have caffeine. These include coffee, soda, and tea.  · Maintain a healthy weight. Do not use diet pills unless your doctor says they are safe for you. Diet pills may make heart problems worse.  · Follow diet instructions as told by your doctor.  · Exercise regularly as told by your doctor.  · Keep all follow-up visits as told by your doctor. This is important.  Contact a doctor if:  · You notice a change in the speed, rhythm, or strength of your heartbeat.  · You are taking a blood-thinning medicine and you notice more  bruising.  · You get tired more easily when you move or exercise.  Get help right away if:  · You have pain in your chest or your belly (abdomen).  · You have sweating or weakness.  · You feel sick to your stomach (nauseous).  · You notice blood in your throw up (vomit), poop (stool), or pee (urine).  · You are short of breath.  · You suddenly have swollen feet and ankles.  · You feel dizzy.  · Your suddenly get weak or numb in your face, arms, or legs, especially if it happens on one side of your body.  · You have trouble talking, trouble understanding, or both.  · Your face or your eyelid droops on one side.  These symptoms may be an emergency. Do not wait to see if the symptoms will go away. Get medical help right away. Call your local emergency services (911 in the [place] Do not drive yourself to the hospital.   This information is not intended to replace advice given to you by your health care provider. Make sure you discuss any questions you have with your health care provider.  Document Released: 09/26/2009 Document Revised: 05/25/2017 Document Reviewed: 04/13/2016  © 2017 [employer]    Hypotension  As your heart beats, it forces blood through your body. This force is called blood pressure. If you have hypotension, you have low blood pressure. When your blood pressure is too low, you may not get enough blood to your brain. You may feel weak, feel light-headed, have a fast heartbeat, or even pass out (faint).  Follow these instructions at home:  Eating and drinking  · Drink enough fluids to keep your pee (urine) clear or pale yellow.  · Eat a healthy diet, and follow instructions from your doctor about eating or drinking restrictions. A healthy diet includes:  ¨ Fresh fruits and vegetables.  ¨ Whole grains.  ¨ Low-fat (lean) meats.  ¨ Low-fat dairy products.  · Eat extra salt only as told. Do not add extra salt to your diet unless your doctor tells you to.  · Eat small meals often.  · Avoid standing up quickly  after you eat.  Medicines  · Take over-the-counter and prescription medicines only as told by your doctor.  ¨ Follow instructions from your doctor about changing how much you take (the dosage) of your medicines, if this applies.  ¨ Do not stop or change your medicine on your own.  General instructions  · Wear compression stockings as told by your doctor.  · Get up slowly from lying down or sitting.  · Avoid hot showers and a lot of heat as told by your doctor.  · Return to your normal activities as told by your doctor. Ask what activities are safe for you.  · Do not use any products that contain nicotine or tobacco, such as cigarettes and e-cigarettes. If you need help quitting, ask your doctor.  · Keep all follow-up visits as told by your doctor. This is important.  Contact a doctor if:  · You throw up (vomit).  · You have watery poop (diarrhea).  · You have a fever for more than 2-3 days.  · You feel more thirsty than normal.  · You feel weak and tired.  Get help right away if:  · You have chest pain.  · You have a fast or irregular heartbeat.  · You lose feeling (get numbness) in any part of your body.  · You cannot move your arms or your legs.  · You have trouble talking.  · You get sweaty or feel light-headed.  · You faint.  · You have trouble breathing.  · You have trouble staying awake.  · You feel confused.  This information is not intended to replace advice given to you by your health care provider. Make sure you discuss any questions you have with your health care provider.  Document Released: 03/14/2011 Document Revised: 09/05/2017 Document Reviewed: 09/05/2017  [employer] Patient Education © 2017 [employer]    Discharge Plan:   Influenza Vaccine Indication: Patient Refuses    I understand that a diet low in cholesterol, fat, and sodium is recommended for good health. Unless I have been given specific instructions below for another diet, I accept this instruction as my diet prescription.  "  Other diet: regular, thin liquid      Special Instructions: None    · Is patient discharged on Warfarin / Coumadin?   Yes    You are receiving the drug warfarin. Please understand the importance of monitoring warfarin with scheduled PT/INR blood draws.  Follow-up with the [facility] in one week for INR lab..    IMPORTANT: HOW TO USE THIS INFORMATION:  This is a summary and does NOT have all possible information about this product. This information does not assure that this product is safe, effective, or appropriate for you. This information is not individual medical advice and does not substitute for the advice of your health care professional. Always ask your health care professional for complete information about this product and your specific health needs.      WARFARIN - ORAL (WARF-uh-rin)      COMMON BRAND NAME(S): Coumadin      WARNING:  Warfarin can cause very serious (possibly fatal) bleeding. This is more likely to occur when you first start taking this medication or if you take too much warfarin. To decrease your risk for bleeding, your doctor or other health care provider will monitor you closely and check your lab results (INR test) to make sure you are not taking too much warfarin. Keep all medical and laboratory appointments. Tell your doctor right away if you notice any signs of serious bleeding. See also Side Effects section.      USES:  This medication is used to treat blood clots (such as in deep vein thrombosis-DVT or pulmonary embolus-PE) and/or to prevent new clots from forming in your body. Preventing harmful blood clots helps to reduce the risk of a stroke or heart attack. Conditions that increase your risk of developing blood clots include a certain type of irregular heart rhythm (atrial fibrillation), heart valve replacement, recent heart attack, and certain surgeries (such as hip/knee replacement). Warfarin is commonly called a \"blood thinner,\" but the more correct term is " "\"anticoagulant.\" It helps to keep blood flowing smoothly in your body by decreasing the amount of certain substances (clotting proteins) in your blood.      HOW TO USE:  Read the Medication Guide provided by your pharmacist before you start taking warfarin and each time you get a refill. If you have any questions, ask your doctor or pharmacist. Take this medication by mouth with or without food as directed by your doctor or other health care professional, usually once a day. It is very important to take it exactly as directed. Do not increase the dose, take it more frequently, or stop using it unless directed by your doctor. Dosage is based on your medical condition, laboratory tests (such as INR), and response to treatment. Your doctor or other health care provider will monitor you closely while you are taking this medication to determine the right dose for you. Use this medication regularly to get the most benefit from it. To help you remember, take it at the same time each day. It is important to eat a balanced, consistent diet while taking warfarin. Some foods can affect how warfarin works in your body and may affect your treatment and dose. Avoid sudden large increases or decreases in your intake of foods high in vitamin K (such as broccoli, cauliflower, cabbage, brussels sprouts, kale, spinach, and other green leafy vegetables, liver, green tea, certain vitamin supplements). If you are trying to lose weight, check with your doctor before you try to go on a diet. Cranberry products may also affect how your warfarin works. Limit the amount of cranberry juice (16 ounces/480 milliliters a day) or other cranberry products you may drink or eat.      SIDE EFFECTS:  Nausea, loss of appetite, or stomach/abdominal pain may occur. If any of these effects persist or worsen, tell your doctor or pharmacist promptly. Remember that your doctor has prescribed this medication because he or she has judged that the benefit to you " is greater than the risk of side effects. Many people using this medication do not have serious side effects. This medication can cause serious bleeding if it affects your blood clotting proteins too much (shown by unusually high INR lab results). Even if your doctor stops your medication, this risk of bleeding can continue for up to a week. Tell your doctor right away if you have any signs of serious bleeding, including: unusual pain/swelling/discomfort, unusual/easy bruising, prolonged bleeding from cuts or gums, persistent/frequent nosebleeds, unusually heavy/prolonged menstrual flow, pink/dark urine, coughing up blood, vomit that is bloody or looks like coffee grounds, severe headache, dizziness/fainting, unusual or persistent tiredness/weakness, bloody/black/tarry stools, chest pain, shortness of breath, difficulty swallowing. Tell your doctor right away if any of these unlikely but serious side effects occur: persistent nausea/vomiting, severe stomach/abdominal pain, yellowing eyes/skin. This drug rarely has caused very serious (possibly fatal) problems if its effects lead to small blood clots (usually at the beginning of treatment). This can lead to severe skin/tissue damage that may require surgery or amputation if left untreated. Patients with certain blood conditions (protein C or S deficiency) may be at greater risk. Get medical help right away if any of these rare but serious side effects occur: painful/red/purplish patches on the skin (such as on the toe, breast, abdomen), change in the amount of urine, vision changes, confusion, slurred speech, weakness on one side of the body. A very serious allergic reaction to this drug is rare. However, get medical help right away if you notice any symptoms of a serious allergic reaction, including: rash, itching/swelling (especially of the face/tongue/throat), severe dizziness, trouble breathing. This is not a complete list of possible side effects. If you notice  other effects not listed above, contact your doctor or pharmacist. In the [place] - Call your doctor for medical advice about side effects. You may report side effects to FDA at 8-251-OPV-9149. In [place] - Call your doctor for medical advice about side effects. You may report side effects to [facility] [place] at 1-757.685.9635.      PRECAUTIONS:  Before taking warfarin, tell your doctor or pharmacist if you are allergic to it; or if you have any other allergies. This product may contain inactive ingredients, which can cause allergic reactions or other problems. Talk to your pharmacist for more details. Before using this medication, tell your doctor or pharmacist your medical history, especially of: blood disorders (such as anemia, hemophilia), bleeding problems (such as bleeding of the stomach/intestines, bleeding in the brain), blood vessel disorders (such as aneurysms), recent major injury/surgery, liver disease, alcohol use, mental/mood disorders (including memory problems), frequent falls/injuries. It is important that all your doctors and dentists know that you take warfarin. Before having surgery or any medical/dental procedures, tell your doctor or dentist that you are taking this medication and about all the products you use (including prescription drugs, nonprescription drugs, and herbal products). Avoid getting injections into the muscles. If you must have an injection into a muscle (for example, a flu shot), it should be given in the arm. This way, it will be easier to check for bleeding and/or apply pressure bandages. This medication may cause stomach bleeding. Daily use of alcohol while using this medicine will increase your risk for stomach bleeding and may also affect how this medication works. Limit or avoid alcoholic beverages. If you have not been eating well, if you have an illness or infection that causes fever, vomiting, or diarrhea for more than 2 days, or if you start using any antibiotic medications,  contact your doctor or pharmacist immediately because these conditions can affect how warfarin works. This medication can cause heavy bleeding. To lower the chance of getting cut, bruised, or injured, use great caution with sharp objects like safety razors and nail cutters. Use an electric razor when shaving and a soft toothbrush when brushing your teeth. Avoid activities such as contact sports. If you fall or injure yourself, especially if you hit your head, call your doctor immediately. Your doctor may need to check you. The Food & Drug Administration has stated that generic warfarin products are interchangeable. However, consult your doctor or pharmacist before switching warfarin products. Be careful not to take more than one medication that contains warfarin unless specifically directed by the doctor or health care provider who is monitoring your warfarin treatment. Older adults may be at greater risk for bleeding while using this drug. This medication is not recommended for use during pregnancy because of serious (possibly fatal) harm to an unborn baby. Discuss the use of reliable forms of birth control with your doctor. If you become pregnant or think you may be pregnant, tell your doctor immediately. If you are planning pregnancy, discuss a plan for managing your condition with your doctor before you become pregnant. Your doctor may switch the type of medication you use during pregnancy. Very small amounts of this medication may pass into breast milk but is unlikely to harm a nursing infant. Consult your doctor before breast-feeding.      DRUG INTERACTIONS:  Drug interactions may change how your medications work or increase your risk for serious side effects. This document does not contain all possible drug interactions. Keep a list of all the products you use (including prescription/nonprescription drugs and herbal products) and share it with your doctor and pharmacist. Do not start, stop, or change the  "dosage of any medicines without your doctor's approval. Warfarin interacts with many prescription, nonprescription, vitamin, and herbal products. This includes medications that are applied to the skin or inside the vagina or rectum. The interactions with warfarin usually result in an increase or decrease in the \"blood-thinning\" (anticoagulant) effect. Your doctor or other health care professional should closely monitor you to prevent serious bleeding or clotting problems. While taking warfarin, it is very important to tell your doctor or pharmacist of any changes in medications, vitamins, or herbal products that you are taking. Some products that may interact with this drug include: capecitabine, imatinib, mifepristone. Aspirin, aspirin-like drugs (salicylates), and nonsteroidal anti-inflammatory drugs (NSAIDs such as ibuprofen, naproxen, celecoxib) may have effects similar to warfarin. These drugs may increase the risk of bleeding problems if taken during treatment with warfarin. Carefully check all prescription/nonprescription product labels (including drugs applied to the skin such as pain-relieving creams) since the products may contain NSAIDs or salicylates. Talk to your doctor about using a different medication (such as acetaminophen) to treat pain/fever. Low-dose aspirin and related drugs (such as clopidogrel, ticlopidine) should be continued if prescribed by your doctor for specific medical reasons such as heart attack or stroke prevention. Consult your doctor or pharmacist for more details. Many herbal products interact with warfarin. Tell your doctor before taking any herbal products, especially bromelains, coenzyme Q10, cranberry, danshen, dong quai, fenugreek, garlic, ginkgo biloba, ginseng, and Kaser's wort, among others. This medication may interfere with a certain laboratory test to measure theophylline levels, possibly causing false test results. Make sure laboratory personnel and all your doctors " know you use this drug.      OVERDOSE:  If overdose is suspected, contact a poison control center or emergency room immediately. [place] residents can call the [place] National Poison Hotline at 1-252.349.1209. [place] residents can call a provincial poison control center. Symptoms of overdose may include: bloody/black/tarry stools, pink/dark urine, unusual/prolonged bleeding.      NOTES:  Do not share this medication with others. Laboratory and/or medical tests (such as INR, complete blood count) must be performed periodically to monitor your progress or check for side effects. Consult your doctor for more details.      MISSED DOSE:  For the best possible benefit, do not miss any doses. If you do miss a dose and remember on the same day, take it as soon as you remember. If you remember on the next day, skip the missed dose and resume your usual dosing schedule. Do not double the dose to catch up because this could increase your risk for bleeding. Keep a record of missed doses to give to your doctor or pharmacist. Contact your doctor or pharmacist if you miss 2 or more doses in a row.      STORAGE:  Store at room temperature away from light and moisture. Do not store in the bathroom. Keep all medications away from children and pets. Do not flush medications down the toilet or pour them into a drain unless instructed to do so. Properly discard this product when it is  or no longer needed. Consult your pharmacist or local waste disposal company for more details about how to safely discard your product.      MEDICAL ALERT:  Your condition and medication can cause complications in a medical emergency. For information about enrolling in MedicAlert, call 1-289.357.8395 ([place]) or 1-755.874.8609 ([place]).      Information last revised 2010 Copyright(c) 2010 [employer]             Depression / Suicide Risk    As you are discharged from this [facility], it is important to learn how to keep safe from  harming yourself.    Recognize the warning signs:  · Abrupt changes in personality, positive or negative- including increase in energy   · Giving away possessions  · Change in eating patterns- significant weight changes-  positive or negative  · Change in sleeping patterns- unable to sleep or sleeping all the time   · Unwillingness or inability to communicate  · Depression  · Unusual sadness, discouragement and loneliness  · Talk of wanting to die  · Neglect of personal appearance   · Rebelliousness- reckless behavior  · Withdrawal from people/activities they love  · Confusion- inability to concentrate     If you or a loved one observes any of these behaviors or has concerns about self-harm, here's what you can do:  · Talk about it- your feelings and reasons for harming yourself  · Remove any means that you might use to hurt yourself (examples: pills, rope, extension cords, firearm)  · Get professional help from the community (Mental Health, Substance Abuse, psychological counseling)  · Do not be alone:Call your Safe Contact- someone whom you trust who will be there for you.  · Call your local CRISIS HOTLINE 264-1981 or 046-666-1873  · Call your local Children's Mobile Crisis Response Team [place] (244) 791-9173 or www.[employer]  · Call the toll free National Suicide Prevention Hotlines   · National Suicide Prevention Lifeline 949-298-WBBJ (8001)  · National Hope Line Network 800-SUICIDE (069-5329)        I

## 2019-10-13 NOTE — CARE PLAN
Problem: Discharge Barriers/Planning  Goal: Patient's continuum of care needs will be met  Outcome: PROGRESSING AS EXPECTED     Problem: Respiratory:  Goal: Respiratory status will improve  Outcome: PROGRESSING AS EXPECTED   Able to wean patient off O2.

## 2019-10-13 NOTE — PROGRESS NOTES
Inpatient Anticoagulation Service Note    Date: 10/13/2019    Reason for Anticoagulation: Atrial Fibrillation   Target INR: 2.0 to 3.0  [id number] VASc Score: 4  HAS-BLED Score: 1   Hemoglobin Value: (!) 11.9  Hematocrit Value: 40.4    INR from last 7 days     Date/Time INR Value    10/13/19 0831  (!) 2.61    10/12/19 1021  (!) 1.33    10/10/19 2355  (!) 1.18        Dose from last 7 days     Date/Time Dose (mg)    10/13/19 1300  0    10/12/19 1400  4    10/11/19 1500  4        Significant Interactions: Amiodarone, Antibiotics, Flagyl, Statin  Bridge Therapy: No    Reversal Agent Administered: Not Applicable    Comments:  86 y.o. female admitted 10/8/2019 with n/v, hypotension. PMH significant for HTN, hypothyroid, Dyslipidemia. Warfarin for AFib, on Amiodarone/Cardizem. DDI noted . Hgb decreased over interval. No documented signs of overt bleeding. INR @ goal, large increase over interval. Given advanced age will hold dose tonight.       Plan:  Warfarin 0mg po daily , INR in AM.   Education Material Provided?: No  Pharmacist suggested discharge dosing: Warfarin 2-3mg po daily , follow up with anticoagulation clinic after discharge.      Lopez Miller PharmD BCPS

## 2019-10-13 NOTE — PROGRESS NOTES
Bedside report received. Patient A&O x4. No complaints of pain or nausea at the moment. Will continue to monitor.     Pt at femoral access previously that was removed today. There is a small amount of serosanguineous blood on dressing. Area around the site is soft, no bruising present. Will continue to monitor.       POC discussed with patient. Patient verbalized understanding. Call light and belongings within reach. Bed locked and in lowest position, alarm and fall precautions in place.

## 2019-10-14 PROBLEM — K56.7 ILEUS (HCC): Status: ACTIVE | Noted: 2019-01-01

## 2019-10-14 PROBLEM — K56.7 ILEUS (HCC): Status: RESOLVED | Noted: 2019-01-01 | Resolved: 2019-01-01

## 2019-10-14 NOTE — TELEPHONE ENCOUNTER
Unable to reach pt regarding referral to anticoagulation from Dr. Kimble for new warfarin. Asked pt to please call back to establish care.     Insurance: medicare  PCP: none   Location: [facility]?        Yair MannD

## 2019-10-14 NOTE — DISCHARGE SUMMARY
Discharge Summary    CHIEF COMPLAINT ON ADMISSION  Chief Complaint   Patient presents with   • N/V     Patient transferred from [facility] after family reported patient n/v x7 days, patient was hypotensive at [facility] medicated pta with 3L fluid.    • Hypotension       Reason for Admission  EMS     Admission Date  10/8/2019    CODE STATUS  Prior    HPI & HOSPITAL COURSE  86 y.o. female who presented 10/8/2019 with past medical history of hypertension, hypothyroidism who presents with nausea and vomiting.  This patient started having nausea and vomiting over the last week.  Progressively worsening.  She had multiple episodes of vomiting.  No diarrhea.  No fever no chills no sweats.  No dysuria.  No cough no shortness of breath.  Otherwise she has not had alleviating or exacerbating factors to her symptoms.  She was found to have significant hypotension at outside hospital thought to be due to significant dehydration with associated acute renal failure.  Transferred to [facility] for further management of her symptoms.    The patient was found to have  Acute renal failure. She was hydrated and this normalized. She c/o dyspepsia- that resolved with carafate and ppi.she was found to have pneumonia(probably aspiration related) that was treated with rocephin and flagyl.  The patient has barium swallow that did not show any acute abnormalities. Pt had CT chest that did comment of dilated stomach and loops of bowel. Patient has eating and having bowel movements during hospital stay. No complaints of abdominal discomfort.she went into afib with rvr during her hospital stay. She was seen by cardiology and started on coumadin and amiodarone.       Therefore, she is discharged in good and stable condition to home with close outpatient follow-up.    The patient met 2-midnight criteria for an inpatient stay at the time of discharge.    Discharge Date  10/13/2019    FOLLOW UP ITEMS POST DISCHARGE    DISCHARGE DIAGNOSES  Principal  Problem (Resolved):    Hypotension POA: Yes  Active Problems:    Hypothyroid POA: Yes    History of breast cancer POA: Yes    Severe protein-calorie malnutrition (HCC) POA: Yes    Dyspepsia POA: Yes  Resolved Problems:    Acute renal failure (ARF) (HCC) POA: Yes    Atrial fibrillation with rapid ventricular response (HCC) POA: No    Hypocalcemia POA: Yes    Sepsis (HCC) POA: Yes    Elevated troponin POA: Yes    Nausea and vomiting POA: Yes    Hyponatremia POA: Yes    Oropharyngeal dysphagia POA: Yes    Essential hypertension POA: Yes    Aspiration pneumonia (HCC) POA: Yes    Odynophagia POA: Yes    Hypokalemia POA: No    Ileus (HCC) POA: Yes      FOLLOW UP  Future Appointments   Date Time Provider Department Center   10/24/2019  3:45 PM JAVIER Carr [facility] None     Primary Care Physician      Please call to schedule your appointment to schedule with your Primary Care Physician when you return home . Thank you     [address] [id number] [phone]        [address] [address]  [phone]        Pcp Not In Computer            MEDICATIONS ON DISCHARGE     Medication List      START taking these medications      Instructions   amiodarone 200 MG Tabs  Commonly known as:  CORDARONE   Take 1 Tab by mouth every day.  Dose:  200 mg     cefdinir 250 MG/5ML suspension  Commonly known as:  OMNICEF   Take 6 mL by mouth every 12 hours for 2 days.  Dose:  300 mg     famotidine 20 MG Tabs  Commonly known as:  PEPCID   Take 1 Tab by mouth every 24 hours.  Dose:  20 mg     metroNIDAZOLE 500 MG Tabs  Commonly known as:  FLAGYL   Take 1 Tab by mouth every 8 hours for 2 days.  Dose:  500 mg     ondansetron 4 MG Tabs tablet  Commonly known as:  ZOFRAN   Take 1 Tab by mouth every four hours as needed for Nausea/Vomiting for up to 14 days.  Dose:  4 mg     sucralfate 1 GM/10ML Susp  Commonly known as:  CARAFATE   Take 10 mL by mouth  every 6 hours for 14 days.  Dose:  1 g     warfarin 2.5 MG Tabs  Commonly known as:  COUMADIN   Take 1 Tab by mouth every day.  Dose:  2.5 mg        CONTINUE taking these medications      Instructions   alendronate 70 MG Tabs  Commonly known as:  FOSAMAX   Take 70 mg by mouth every 7 days.  Dose:  70 mg     anastrozole 1 MG Tabs  Commonly known as:  ARIMIDEX   Take 1 mg by mouth every day.  Dose:  1 mg     hydrocodone-acetaminophen 5-500 MG Tabs  Commonly known as:  VICODIN   Take 1-2 Tabs by mouth every four hours as needed.  Dose:  1-2 Tab     lovastatin 20 MG Tabs  Commonly known as:  MEVACOR   Take 20 mg by mouth every evening.  Dose:  20 mg     ONE-A-DAY WOMENS FORMULA PO   Take  by mouth every day.     SYNTHROID 100 MCG Tabs  Generic drug:  levothyroxine   Take 100 mcg by mouth every day.  Dose:  100 mcg        STOP taking these medications    NORVASC 10 MG Tabs  Generic drug:  amLODIPine            Allergies  Allergies   Allergen Reactions   • Pcn [Penicillins] Hives     Tolerated Ceftriaxone 10/8/19       DIET  No orders of the defined types were placed in this encounter.      ACTIVITY  As tolerated.  Weight bearing as tolerated    CONSULTATIONS  Dr [facility] cards    PROCEDURES  none    LABORATORY  Lab Results   Component Value Date    SODIUM 140 10/13/2019    POTASSIUM 3.5 (L) 10/13/2019    CHLORIDE 109 10/13/2019    CO2 22 10/13/2019    GLUCOSE 98 10/13/2019    BUN 8 10/13/2019    CREATININE 0.62 10/13/2019        Lab Results   Component Value Date    WBC 10.9 (H) 10/13/2019    HEMOGLOBIN 11.9 (L) 10/13/2019    HEMATOCRIT 40.4 10/13/2019    PLATELETCT 160 (L) 10/13/2019        Total time of the discharge process exceeds 39  minutes.

## 2019-10-16 ENCOUNTER — TELEPHONE (OUTPATIENT)
Dept: VASCULAR LAB | Facility: MEDICAL CENTER | Age: 84
End: 2019-10-16

## 2019-10-16 NOTE — LETTER
October 16, 2019    Jami Kerr  [address] [place] [address]      Dear Jami Kerr ,    We have been unsuccessful in our attempts to contact you regarding your Anticoagulation Service referral.  Anticoagulants are medications that can cause potential harmful side effects when not monitored properly. Without proper monitoring there is potential for serious, sometimes life-threatening bleeding problems or life-threatening blood clots or stroke could result.    Please contact our clinic so we may assist you.  We are open Monday-Friday 8 am until 5 pm.  You may reach our Service at [phone]. We do offer telemedicine appointments in [place] on Fridays by appointment only. Please call and we can schedule you for this.     To monitor your anticoagulant effectively, we need to be able to communicate with you.  This is a requirement to be followed by our Service.  Please contact the clinic as soon as possible to establish care and provide your current contact information.  Thank you.        Sincerely,        Geraldo Goins, PharmD, [facility]  Pharmacy Clinical Supervisor  [facility]  Outpatient Anticoagulation Service

## 2019-10-16 NOTE — TELEPHONE ENCOUNTER
Tried to call pt regarding anticoagulation referral for warfarin from Dr. Adrian Kimble on 10/13/19.    Only number listed rings and rings and then hangs up. Only ER contact number not working.   Will send letter.     Insurance: medicare  PCP: none   Location: [facility]?      Carina Meredith, PharmD

## 2019-10-18 ENCOUNTER — TELEPHONE (OUTPATIENT)
Dept: VASCULAR LAB | Facility: MEDICAL CENTER | Age: 84
End: 2019-10-18

## 2019-10-18 NOTE — TELEPHONE ENCOUNTER
Tried to call pt regarding anticoagulation referral for warfarin from Dr. Adrian Kimble on 10/13/19.     No working phone numbers, letter was sent on 10/16/19.    I see that pt has appt with Cardiology on 10/24/19 at [facility], I booked an appt with our clinic to establish care regarding the warfarin after this appointment hoping that pt can attend as I can't reach her. Will send FYI to cardiology.      Insurance: medicare  PCP: none   Location: [facility]?       Carina Meredith, PharmD

## 2019-10-30 ENCOUNTER — TELEPHONE (OUTPATIENT)
Dept: VASCULAR LAB | Facility: MEDICAL CENTER | Age: 84
End: 2019-10-30

## 2019-10-30 NOTE — TELEPHONE ENCOUNTER
Attempted to reach patient regarding referral to anticoagulation services.  Phone will not connect, just rings then cuts out.  Patient no show to cardiology follow up and to anticoag appt that was arranged.  Will send another letter to patient's listed address in hopes of contact.  Should no response be received, will forward back to referring provider.  Virgilio Perez, PharmD, BCACP

## 2019-10-31 NOTE — DOCUMENTATION QUERY
[facility]                                                                       Query Response Note      PATIENT:               DELLA WADDELL  ACCT #:                  [id number]  MRN:                     [id number]  :                      1933  ADMIT DATE:       10/8/2019 9:25 PM  DISCH DATE:        10/13/2019 3:14 PM  RESPONDING  PROVIDER #:        [id number]           QUERY TEXT:      Sepsis and hypotension have been documented in the Medical Record.  Please further specify this documentation. Patient with central line from OSH, SBP <90.    NOTE:  If an appropriate response is not listed below, please respond with a new note.        The patient's Clinical Indicators include:  Hypotension (despite fluid resuscitation per ED note)   BP in ED, 73/54  Central line from OSH  acute renal failure  Sepsis  Aspiration Pneumonia   BP -  low 90s systolic  500 ml normal saline  Options provided:   -- Sepsis with hypotension without septic shock   -- Sepsis with septic shock   -- Unable to determine      Query created by: Sawallisch, Beth on 10/29/2019 7:54 AM    RESPONSE TEXT:    Sepsis with hypotension without septic shock          Electronically signed by:  TERE TAVAREZ 10/31/2019 6:18 AM

## 2019-11-14 ENCOUNTER — TELEPHONE (OUTPATIENT)
Dept: VASCULAR LAB | Facility: MEDICAL CENTER | Age: 84
End: 2019-11-14

## 2019-11-14 NOTE — TELEPHONE ENCOUNTER
Tried to call pt regarding anticoagulation referral for warfarin from Dr. Adrian Kimble on 10/13/19.     Only number listed rings and rings and then hangs up. Only ER contact number not working.     Pt no showed cardiology and anticoagulation appt that was set on 10/24/19. No response to letter that was sent on 10/16/19.    Will send another letter and FYI to referring provider that we have been unable to establish care.      Insurance: medicare  PCP: none   Location: [facility]?       Carina Meredith, PharmD

## 2022-11-28 NOTE — LETTER
November 14, 2019      Jami Kerr  [address] [place] [address]      Dear Jami Kerr ,    We have been unsuccessful in our attempts to contact you regarding your Anticoagulation Service referral.  Anticoagulants are medications that can cause potential harmful side effects when not monitored properly. Without proper monitoring there is potential for serious, sometimes life-threatening bleeding problems or life-threatening blood clots or stroke could result.    Please contact our clinic so we may assist you.  We are open Monday-Friday 8 am until 5 pm.  You may reach our Service at [phone].    To monitor your anticoagulant effectively, we need to be able to communicate with you.  This is a requirement to be followed by our Service.  Please contact the clinic as soon as possible to establish care and provide your current contact information.  Thank you.        Sincerely,        Geraldo Goins, PharmD, [facility]  Pharmacy Clinical Supervisor  [facility]  Outpatient Anticoagulation Service                                  Carina M. Topol, PharmD      
Render Risk Assessment In Note?: no
Additional Notes: Switched back to prior thyroid medication and itch is improving.
Detail Level: Simple